# Patient Record
Sex: FEMALE | Race: WHITE | Employment: FULL TIME | URBAN - NONMETROPOLITAN AREA
[De-identification: names, ages, dates, MRNs, and addresses within clinical notes are randomized per-mention and may not be internally consistent; named-entity substitution may affect disease eponyms.]

---

## 2020-06-26 LAB
CREATININE, EXTERNAL: 0.78
LDL-C, EXTERNAL: 128

## 2020-10-21 RX ORDER — CITALOPRAM 10 MG/1
10 TABLET ORAL DAILY
COMMUNITY
End: 2020-10-21 | Stop reason: SDUPTHER

## 2020-10-22 RX ORDER — CITALOPRAM 10 MG/1
10 TABLET ORAL DAILY
Qty: 90 TAB | Refills: 1 | Status: SHIPPED | OUTPATIENT
Start: 2020-10-22 | End: 2021-06-22 | Stop reason: SDUPTHER

## 2020-11-19 VITALS — HEIGHT: 62 IN

## 2020-11-19 PROBLEM — F41.9 ANXIETY: Status: ACTIVE | Noted: 2020-11-19

## 2020-11-19 RX ORDER — LEVONORGESTREL AND ETHINYL ESTRADIOL 0.1-0.02MG
KIT ORAL
COMMUNITY

## 2020-11-19 RX ORDER — LEVOTHYROXINE SODIUM 88 UG/1
TABLET ORAL
COMMUNITY
End: 2021-06-23

## 2020-11-20 ENCOUNTER — VIRTUAL VISIT (OUTPATIENT)
Dept: FAMILY MEDICINE CLINIC | Age: 45
End: 2020-11-20
Payer: MEDICAID

## 2020-11-20 DIAGNOSIS — E03.9 ACQUIRED HYPOTHYROIDISM: ICD-10-CM

## 2020-11-20 DIAGNOSIS — E78.2 MIXED HYPERLIPIDEMIA: ICD-10-CM

## 2020-11-20 DIAGNOSIS — E55.9 VITAMIN D DEFICIENCY: ICD-10-CM

## 2020-11-20 DIAGNOSIS — F41.9 ANXIETY: Primary | ICD-10-CM

## 2020-11-20 PROCEDURE — 99443 PR PHYS/QHP TELEPHONE EVALUATION 21-30 MIN: CPT | Performed by: NURSE PRACTITIONER

## 2020-11-20 NOTE — PROGRESS NOTES
Lukasz Adrianas presents today for   Chief Complaint   Patient presents with    Follow Up Chronic Condition    Hypothyroidism    Anxiety    Cholesterol Problem       Depression Screening:  3 most recent PHQ Screens 11/20/2020   Little interest or pleasure in doing things Not at all   Feeling down, depressed, irritable, or hopeless Not at all   Total Score PHQ 2 0       Learning Assessment:  No flowsheet data found. Abuse Screening:  No flowsheet data found. Fall Risk  Fall Risk Assessment, last 12 mths 11/20/2020   Able to walk? Yes   Fall in past 12 months? No       ADL  ADL Assessment 11/20/2020   Feeding yourself No Help Needed   Getting from bed to chair No Help Needed   Getting dressed No Help Needed   Bathing or showering No Help Needed   Walk across the room (includes cane/walker) No Help Needed   Using the telphone No Help Needed   Taking your medications No Help Needed   Preparing meals No Help Needed   Managing money (expenses/bills) No Help Needed   Moderately strenuous housework (laundry) No Help Needed   Shopping for personal items (toiletries/medicines) No Help Needed   Shopping for groceries No Help Needed   Driving No Help Needed   Climbing a flight of stairs No Help Needed   Getting to places beyond walking distances No Help Needed       Health Maintenance reviewed and discussed and ordered per Provider. Health Maintenance Due   Topic Date Due    DTaP/Tdap/Td series (1 - Tdap) 06/17/1996    PAP AKA CERVICAL CYTOLOGY  06/17/1996    Flu Vaccine (1) 09/01/2020   . Coordination of Care:  1. Have you been to the ER, urgent care clinic since your last visit? Hospitalized since your last visit? Urgent Care- positive Covid x Oct. 20, 2020    2. Have you seen or consulted any other health care providers outside of the [address] [place] [address] since your last visit? Include any pap smears or colon screening.  No       Pap- 09/03/20 per patient   Mammogram - 09/03/20 per patient

## 2020-11-20 NOTE — PROGRESS NOTES
Carmela Fink is a 39 y.o. female, evaluated via audio-only technology on 11/20/2020 for Follow Up Chronic Condition; Hypothyroidism; Anxiety; and Cholesterol Problem  . Assessment & Plan:   Diagnoses and all orders for this visit:    1. Anxiety  -Controlled at this time   -Continue with medication as directed   -For any worsening please follow-up  2. Acquired hypothyroidism  -Checking status   - I will adjust medication if needed   -For now continue with medication as directed   -Follow-up in 4 months   -     CBC W/O DIFF; Future  -     METABOLIC PANEL, COMPREHENSIVE; Future  -     T4, FREE; Future  -     TSH 3RD GENERATION; Future    3. Mixed hyperlipidemia  -Checking status   -     LIPID PANEL; Future    4. Vitamin D deficiency  -Checking status   -     VITAMIN D, 25 HYDROXY            12  Subjective: Anxiety   Patient is seen for anxiety disorder. Current treatment includes Celexa and no other therapies. Ongoing symptoms include: none. Patient denies: palpitations, shortness of breath, racing thoughts, psychomotor agitation, difficulty concentrating. Reported side effects from the treatment: none. Hypothyroidism  Patient complains of hypothyroidism. Symptoms include None at this time. Patient denies weight changes, heat / cold intolerance, change in energy level, palpitations, nervousness, diarrhea. Compliant with medication, taking as directed. Prior to Admission medications    Medication Sig Start Date End Date Taking? Authorizing Provider   levonorgestrel-ethinyl estradiol (AVIANE, ALESSE, LESSINA) 0.1-20 mg-mcg tab Take  by mouth. Yes Provider, Historical   levothyroxine (Synthroid) 88 mcg tablet Take  by mouth Daily (before breakfast). Yes Provider, Historical   citalopram (CELEXA) 10 mg tablet Take 1 Tab by mouth daily. 10/22/20  Yes Shira Vuong NP         Review of Systems   Constitutional: Negative for chills, fever, malaise/fatigue and weight loss.    Eyes: Negative for blurred vision. Respiratory: Negative for cough. Cardiovascular: Negative for chest pain. Gastrointestinal: Negative for abdominal pain and heartburn. Genitourinary: Negative for dysuria. Musculoskeletal: Negative for myalgias. Skin: Negative for rash. Neurological: Negative for dizziness, weakness and headaches. Endo/Heme/Allergies: Does not bruise/bleed easily. Psychiatric/Behavioral: Negative for depression and suicidal ideas. The patient is not nervous/anxious and does not have insomnia. Physical Exam  Vitals signs and nursing note reviewed. Constitutional:       Comments: Physical exam was deferred due to COVID- 19 precautions as visit was completed via telemedicine. No flowsheet data found. Erik Alonzo, who was evaluated through a patient-initiated, synchronous (real-time) audio only encounter, and/or her healthcare decision maker, is aware that it is a billable service, with coverage as determined by her insurance carrier. She provided verbal consent to proceed: Yes. She has not had a related appointment within my department in the past 7 days or scheduled within the next 24 hours.       Total Time: minutes: 21-30 minutes    Magnolia Mayer NP

## 2020-11-24 ENCOUNTER — HOSPITAL ENCOUNTER (OUTPATIENT)
Dept: LAB | Age: 45
Discharge: HOME | End: 2020-11-24

## 2020-12-04 ENCOUNTER — TELEPHONE (OUTPATIENT)
Dept: FAMILY MEDICINE CLINIC | Age: 45
End: 2020-12-04

## 2020-12-07 RX ORDER — LEVOTHYROXINE SODIUM 88 UG/1
1 TABLET ORAL
COMMUNITY
End: 2020-12-16 | Stop reason: SDUPTHER

## 2020-12-07 RX ORDER — LEVOTHYROXINE SODIUM 88 UG/1
88 TABLET ORAL
Qty: 90 TAB | Refills: 1 | Status: CANCELLED | OUTPATIENT
Start: 2020-12-07

## 2020-12-07 NOTE — TELEPHONE ENCOUNTER
Spoke with patient and informed her labs were normal per provider however we did not receive her vitamin d results yet. I informed patient that hospital may have not drawn that lab from 11/20 and only jesus her 11/24 labs. Patient was advised to go back to hospital to get lab for vitamin d drawn. Patient verbalized understanding.

## 2020-12-17 RX ORDER — LEVOTHYROXINE SODIUM 88 UG/1
88 TABLET ORAL
Qty: 90 TAB | Refills: 1 | Status: SHIPPED | OUTPATIENT
Start: 2020-12-17 | End: 2021-04-19

## 2021-02-19 ENCOUNTER — HOSPITAL ENCOUNTER (OUTPATIENT)
Dept: LAB | Age: 46
Discharge: HOME | End: 2021-02-19

## 2021-04-19 RX ORDER — LEVOTHYROXINE SODIUM 88 UG/1
TABLET ORAL
Qty: 90 TAB | Refills: 1 | Status: SHIPPED | OUTPATIENT
Start: 2021-04-19 | End: 2021-05-03 | Stop reason: SDUPTHER

## 2021-05-03 RX ORDER — LEVOTHYROXINE SODIUM 88 UG/1
TABLET ORAL
Qty: 90 TAB | Refills: 1 | Status: SHIPPED | OUTPATIENT
Start: 2021-05-03 | End: 2021-11-05 | Stop reason: SDUPTHER

## 2021-05-12 ENCOUNTER — VIRTUAL VISIT (OUTPATIENT)
Dept: FAMILY MEDICINE CLINIC | Age: 46
End: 2021-05-12
Payer: MEDICAID

## 2021-05-12 DIAGNOSIS — F41.9 ANXIETY: Primary | ICD-10-CM

## 2021-05-12 DIAGNOSIS — N30.00 ACUTE CYSTITIS WITHOUT HEMATURIA: ICD-10-CM

## 2021-05-12 LAB
BILIRUB UR QL STRIP: NEGATIVE
GLUCOSE UR-MCNC: NEGATIVE MG/DL
KETONES P FAST UR STRIP-MCNC: NEGATIVE MG/DL
PH UR STRIP: 7 [PH] (ref 4.6–8)
PROT UR QL STRIP: NEGATIVE
SP GR UR STRIP: 1.01 (ref 1–1.03)
UA UROBILINOGEN AMB POC: NORMAL (ref 0.2–1)
URINALYSIS CLARITY POC: CLEAR
URINALYSIS COLOR POC: NORMAL
URINE BLOOD POC: NORMAL
URINE LEUKOCYTES POC: NORMAL
URINE NITRITES POC: NEGATIVE

## 2021-05-12 PROCEDURE — 99212 OFFICE O/P EST SF 10 MIN: CPT | Performed by: INTERNAL MEDICINE

## 2021-05-12 PROCEDURE — 81000 URINALYSIS NONAUTO W/SCOPE: CPT | Performed by: INTERNAL MEDICINE

## 2021-05-12 RX ORDER — NITROFURANTOIN (MACROCRYSTALS) 100 MG/1
100 CAPSULE ORAL 2 TIMES DAILY
Qty: 20 CAP | Refills: 0 | Status: SHIPPED | OUTPATIENT
Start: 2021-05-12 | End: 2021-05-22

## 2021-05-12 NOTE — PROGRESS NOTES
Saratha Schwab (: 1975) is a 39 y.o. female, established patient, here for evaluation of the following chief complaint(s):   Urinary Frequency (last several days) and Vaginal Discharge  Patient has several days of discomfort increased frequency change of color. Review of systems was negative other than for her urinary complaints. Her work-up in the past has been negative. ASSESSMENT/PLAN:  Below is the assessment and plan developed based on review of pertinent labs, studies, and medications. AMB POC URINALYSIS DIP STICK MANUAL W/ MICRO    2. Acute cystitis without hematuria  Patient has a history of UTIs for which she is seen in urological evaluation. She otherwise currently has no fever or chills. Does note some changes in her urine color as well as some thickness to the urine. Therefore we will start her on some antibiotics. Noted nitrofurantoin. Culture. - CULTURE, URINE; Future      No follow-ups on file. SUBJECTIVE/OBJECTIVE:  HPI    Review of Systems     No flowsheet data found. Physical Exam    Patient has normal mentation normal hearing normal speech. Saratha Schwab, was evaluated through a synchronous (real-time) audio-video encounter. The patient (or guardian if applicable) is aware that this is a billable service. Verbal consent to proceed has been obtained within the past 12 months. The visit was conducted pursuant to the emergency declaration under the [address] [place] [address], [address] [place] [address] authority and the [employer] and [employer] General Act. Patient identification was verified, and a caregiver was present when appropriate. The patient was located in a state where the provider was credentialed to provide care. An electronic signature was used to authenticate this note.   -- Melissa Pinedo MD

## 2021-05-12 NOTE — PROGRESS NOTES
Hollie Hernandez presents today for   Chief Complaint   Patient presents with    Urinary Frequency     last several days    Vaginal Discharge       Virtual/telephone visit    Depression Screening:  3 most recent PHQ Screens 5/12/2021   Little interest or pleasure in doing things Not at all   Feeling down, depressed, irritable, or hopeless Not at all   Total Score PHQ 2 0       Learning Assessment:  Learning Assessment 11/20/2020   PRIMARY LEARNER Patient   HIGHEST LEVEL OF EDUCATION - PRIMARY LEARNER  2 YEARS [address] [employer] LEARNER NONE   CO-LEARNER CAREGIVER No   PRIMARY LANGUAGE ENGLISH   LEARNER PREFERENCE PRIMARY DEMONSTRATION   ANSWERED BY Jovanna Haji   RELATIONSHIP SELF       Fall Risk  Fall Risk Assessment, last 12 mths 11/20/2020   Able to walk? Yes   Fall in past 12 months? No       ADL  ADL Assessment 11/20/2020   Feeding yourself No Help Needed   Getting from bed to chair No Help Needed   Getting dressed No Help Needed   Bathing or showering No Help Needed   Walk across the room (includes cane/walker) No Help Needed   Using the telphone No Help Needed   Taking your medications No Help Needed   Preparing meals No Help Needed   Managing money (expenses/bills) No Help Needed   Moderately strenuous housework (laundry) No Help Needed   Shopping for personal items (toiletries/medicines) No Help Needed   Shopping for groceries No Help Needed   Driving No Help Needed   Climbing a flight of stairs No Help Needed   Getting to places beyond walking distances No Help Needed       Health Maintenance reviewed and discussed and ordered per Provider. Health Maintenance Due   Topic Date Due    Hepatitis C Screening  Never done    COVID-19 Vaccine (1) Never done    DTaP/Tdap/Td series (1 - Tdap) Never done    PAP AKA CERVICAL CYTOLOGY  Never done   . Coordination of Care:  1. Have you been to the ER, urgent care clinic since your last visit? Hospitalized since your last visit? No    2.  Have you seen or consulted any other health care providers outside of the [address] [place] [address] since your last visit? Include any pap smears or colon screening.  No

## 2021-05-16 LAB
BACTERIA UR CULT: ABNORMAL
BACTERIA UR CULT: ABNORMAL
SPECIMEN STATUS REPORT, ROLRST: NORMAL

## 2021-06-22 RX ORDER — CITALOPRAM 10 MG/1
10 TABLET ORAL DAILY
Qty: 90 TABLET | Refills: 1 | Status: SHIPPED | OUTPATIENT
Start: 2021-06-22 | End: 2021-12-29 | Stop reason: SDUPTHER

## 2021-06-22 NOTE — TELEPHONE ENCOUNTER
Requested Prescriptions     Pending Prescriptions Disp Refills    citalopram (CELEXA) 10 mg tablet 90 Tablet 1     Sig: Take 1 Tablet by mouth daily.

## 2021-06-23 ENCOUNTER — OFFICE VISIT (OUTPATIENT)
Dept: FAMILY MEDICINE CLINIC | Age: 46
End: 2021-06-23
Payer: COMMERCIAL

## 2021-06-23 VITALS
BODY MASS INDEX: 28.9 KG/M2 | TEMPERATURE: 98.4 F | OXYGEN SATURATION: 99 % | WEIGHT: 158 LBS | HEART RATE: 78 BPM | DIASTOLIC BLOOD PRESSURE: 75 MMHG | SYSTOLIC BLOOD PRESSURE: 112 MMHG

## 2021-06-23 DIAGNOSIS — E55.9 VITAMIN D DEFICIENCY: ICD-10-CM

## 2021-06-23 DIAGNOSIS — Z76.89 ENCOUNTER TO ESTABLISH CARE WITH NEW DOCTOR: ICD-10-CM

## 2021-06-23 DIAGNOSIS — E03.9 ACQUIRED HYPOTHYROIDISM: Primary | ICD-10-CM

## 2021-06-23 DIAGNOSIS — E03.9 ACQUIRED HYPOTHYROIDISM: ICD-10-CM

## 2021-06-23 PROCEDURE — 99213 OFFICE O/P EST LOW 20 MIN: CPT | Performed by: NURSE PRACTITIONER

## 2021-06-23 NOTE — PROGRESS NOTES
Brittany Farrellneal presents today for   Chief Complaint   Patient presents with   [address] [facility]    Follow-up     6 month follow up with no complaints, patient has appt with her OB in September for her PAP       Is someone accompanying this pt? no    Is the patient using any DME equipment during [address]? no    Depression Screening:  3 most recent PHQ Screens 6/23/2021   Little interest or pleasure in doing things Not at all   Feeling down, depressed, irritable, or hopeless Not at all   Total Score PHQ 2 0       Learning Assessment:  Learning Assessment 11/20/2020   PRIMARY LEARNER Patient   HIGHEST LEVEL OF EDUCATION - PRIMARY LEARNER  [facility] NONE   CO-LEARNER CAREGIVER No   PRIMARY LANGUAGE ENGLISH   LEARNER PREFERENCE PRIMARY DEMONSTRATION   ANSWERED BY Aamir Marie   RELATIONSHIP SELF       Fall Risk  Fall Risk Assessment, last 12 mths 11/20/2020   Able to walk? Yes   Fall in past 12 months? No       ADL  ADL Assessment 6/23/2021   Feeding yourself No Help Needed   Getting from bed to chair No Help Needed   Getting dressed No Help Needed   Bathing or showering No Help Needed   Walk across the room (includes cane/walker) No Help Needed   Using the telphone No Help Needed   Taking your medications No Help Needed   Preparing meals No Help Needed   Managing money (expenses/bills) No Help Needed   Moderately strenuous housework (laundry) No Help Needed   Shopping for personal items (toiletries/medicines) No Help Needed   Shopping for groceries No Help Needed   Driving No Help Needed   Climbing a flight of stairs No Help Needed   Getting to places beyond walking distances No Help Needed       Travel Screening:    Travel Screening     Question   Response    In the last month, have you been in contact with someone who was confirmed or suspected to have Coronavirus / COVID-19? No / Unsure    Have you had a COVID-19 viral test in the last 14 days?   No    Do you have any of the following new or worsening symptoms? None of these    Have you traveled internationally or domestically in the last month? No      Travel History   Travel since 05/23/21     No documented travel since 05/23/21          Health Maintenance reviewed and discussed and ordered per Provider. Health Maintenance Due   Topic Date Due    Hepatitis C Screening  Never done    COVID-19 Vaccine (1) Never done    DTaP/Tdap/Td series (1 - Tdap) Never done    PAP AKA CERVICAL CYTOLOGY  Never done   . Coordination of Care:  1. Have you been to the ER, urgent care clinic since your last visit? Hospitalized since your last visit? no    2. Have you seen or consulted any other health care providers outside of the [address] [place] [address] since your last visit? Include any pap smears or colon screening.  no

## 2021-06-23 NOTE — PROGRESS NOTES
[facility] Medicine    Progress Note      Patient: Amanda Wallis MRN: [id number]  SSN: [id number]    YOB: 1975  Age: 55 y.o. Sex: female        Assessment:     1. Hypothyroidism  2. Anxiety. Plan:     1. TSH ordered. 2. Continue Celexa for anxiety  3. Continue oral birth control medication. 4. Follow up in 1 year for TSH monitoring and medication efficacy        Subjective:     No new complaints. Patient reports she will see ophthalmologist for right eye tear duct overproduction problem. Awake, alert, appropriate, oriented x 3  Voiding  VSS    Objective:       HEENT: wnl  HEART: RRR, no rubs or gallops  LUNGS: Clear to auscultation  ABDOMEN: soft with active BS. No tenderness, no distention, no organomegaly  EXT: warm,no edema              Labs: Results:   Chemistry No results for input(s): GLU, NA, K, CL, CO2, BUN, CREA, CA, AGAP, BUCR, TBIL, AP, TP, ALB, GLOB, AGRAT in the last 72 hours. No lab exists for component: GPT   CBC w/Diff No results for input(s): WBC, RBC, HGB, HCT, PLT, GRANS, LYMPH, EOS, HGBEXT, HCTEXT, PLTEXT in the last 72 hours. Coagulation No results for input(s): PTP, INR, APTT, INREXT in the last 72 hours. Iron/Ferritin No results for input(s): IRON in the last 72 hours. No lab exists for component: TIBCCALC   BNP No results for input(s): BNPP in the last 72 hours. Cardiac Enzymes No results for input(s): CPK, CKND1, RUDDY in the last 72 hours. No lab exists for component: CKRMB, TROIP   Liver Enzymes No results for input(s): TP, ALB, TBIL, AP in the last 72 hours.     No lab exists for component: SGOT, GPT, DBIL   Thyroid Studies No results found for: T4, T3U, TSH, TSHEXT         All Micro Results     None                    Signed By: Haresh Aceves NP     June 23, 2021

## 2021-06-25 ENCOUNTER — HOSPITAL ENCOUNTER (OUTPATIENT)
Dept: LAB | Age: 46
Discharge: HOME | End: 2021-06-25

## 2021-06-26 LAB
25(OH)D3+25(OH)D2 SERPL-MCNC: 35.4 NG/ML (ref 30–100)
ALBUMIN SERPL-MCNC: 3.8 G/DL (ref 3.8–4.8)
ALBUMIN/GLOB SERPL: 1.6 {RATIO} (ref 1.2–2.2)
ALP SERPL-CCNC: 75 IU/L (ref 48–121)
ALT SERPL-CCNC: 7 IU/L (ref 0–32)
AST SERPL-CCNC: 17 IU/L (ref 0–40)
BILIRUB SERPL-MCNC: 0.3 MG/DL (ref 0–1.2)
BUN SERPL-MCNC: 13 MG/DL (ref 6–24)
BUN/CREAT SERPL: 21 (ref 9–23)
CALCIUM SERPL-MCNC: 8.6 MG/DL (ref 8.7–10.2)
CHLORIDE SERPL-SCNC: 106 MMOL/L (ref 96–106)
CHOLEST SERPL-MCNC: 202 MG/DL (ref 100–199)
CO2 SERPL-SCNC: 24 MMOL/L (ref 20–29)
CREAT SERPL-MCNC: 0.61 MG/DL (ref 0.57–1)
ERYTHROCYTE [DISTWIDTH] IN BLOOD BY AUTOMATED COUNT: 12.1 % (ref 11.7–15.4)
EST. AVERAGE GLUCOSE BLD GHB EST-MCNC: 105 MG/DL
GLOBULIN SER CALC-MCNC: 2.4 G/DL (ref 1.5–4.5)
GLUCOSE SERPL-MCNC: 83 MG/DL (ref 65–99)
HBA1C MFR BLD: 5.3 % (ref 4.8–5.6)
HCT VFR BLD AUTO: 40.1 % (ref 34–46.6)
HDLC SERPL-MCNC: 45 MG/DL
HGB BLD-MCNC: 13.3 G/DL (ref 11.1–15.9)
LDLC SERPL CALC-MCNC: 141 MG/DL (ref 0–99)
MCH RBC QN AUTO: 31.2 PG (ref 26.6–33)
MCHC RBC AUTO-ENTMCNC: 33.2 G/DL (ref 31.5–35.7)
MCV RBC AUTO: 94 FL (ref 79–97)
PLATELET # BLD AUTO: 236 X10E3/UL (ref 150–450)
POTASSIUM SERPL-SCNC: 4.4 MMOL/L (ref 3.5–5.2)
PROT SERPL-MCNC: 6.2 G/DL (ref 6–8.5)
RBC # BLD AUTO: 4.26 X10E6/UL (ref 3.77–5.28)
SODIUM SERPL-SCNC: 140 MMOL/L (ref 134–144)
TRIGL SERPL-MCNC: 90 MG/DL (ref 0–149)
TSH SERPL DL<=0.005 MIU/L-ACNC: 1.44 UIU/ML (ref 0.45–4.5)
VIT B12 SERPL-MCNC: 415 PG/ML (ref 232–1245)
VLDLC SERPL CALC-MCNC: 16 MG/DL (ref 5–40)
WBC # BLD AUTO: 4.3 X10E3/UL (ref 3.4–10.8)

## 2021-06-28 NOTE — PROGRESS NOTES
The patient was contacted in reference to her resulted laboratory values. LDL = 160, total cholesterol = 231, and Hgb A1c=5.8. During the conversation the patient was advised of her elevated lipid results and elevated hemoglobin A1c. She was advised to come in for an appointment to discuss a plan to address these at a parameter lab results. The patient did not want to come in for an appointment until 6 months. She said she would start to eat better and her elevated labs would come down. The patient was educated her of increased risk of a cardiovascular event based on her ASCVD Risk = 13.8, optimal ASCVD Risk=6. 9.

## 2021-06-29 ENCOUNTER — TELEPHONE (OUTPATIENT)
Dept: FAMILY MEDICINE CLINIC | Age: 46
End: 2021-06-29

## 2021-08-30 ENCOUNTER — HOSPITAL ENCOUNTER (OUTPATIENT)
Dept: GENERAL RADIOLOGY | Age: 46
Discharge: HOME | End: 2021-08-30
Attending: NURSE PRACTITIONER
Payer: MEDICAID

## 2021-08-30 ENCOUNTER — HOSPITAL ENCOUNTER (OUTPATIENT)
Dept: GENERAL RADIOLOGY | Age: 46
Discharge: HOME | End: 2021-08-30
Payer: MEDICAID

## 2021-08-30 ENCOUNTER — OFFICE VISIT (OUTPATIENT)
Dept: FAMILY MEDICINE CLINIC | Age: 46
End: 2021-08-30
Payer: MEDICAID

## 2021-08-30 VITALS
HEART RATE: 87 BPM | OXYGEN SATURATION: 100 % | BODY MASS INDEX: 28.49 KG/M2 | HEIGHT: 62 IN | WEIGHT: 154.8 LBS | SYSTOLIC BLOOD PRESSURE: 139 MMHG | DIASTOLIC BLOOD PRESSURE: 83 MMHG

## 2021-08-30 DIAGNOSIS — M54.2 CERVICALGIA: ICD-10-CM

## 2021-08-30 DIAGNOSIS — M62.838 NECK MUSCLE SPASM: ICD-10-CM

## 2021-08-30 DIAGNOSIS — M54.2 CERVICALGIA: Primary | ICD-10-CM

## 2021-08-30 PROCEDURE — 99214 OFFICE O/P EST MOD 30 MIN: CPT | Performed by: NURSE PRACTITIONER

## 2021-08-30 PROCEDURE — 72040 X-RAY EXAM NECK SPINE 2-3 VW: CPT

## 2021-08-30 RX ORDER — PREDNISONE 20 MG/1
TABLET ORAL
Qty: 21 TABLET | Refills: 0 | Status: SHIPPED | OUTPATIENT
Start: 2021-08-30 | End: 2022-05-23 | Stop reason: ALTCHOICE

## 2021-08-30 RX ORDER — TIZANIDINE 4 MG/1
TABLET ORAL
Qty: 42 TABLET | Refills: 1 | Status: SHIPPED | OUTPATIENT
Start: 2021-08-30 | End: 2022-05-23 | Stop reason: ALTCHOICE

## 2021-08-30 NOTE — PROGRESS NOTES
Ena Cook presents today for   Chief Complaint   Patient presents with    Headache       Is someone accompanying this pt? no    Is the patient using any DME equipment during OV? no    Depression Screening:  3 most recent PHQ Screens 8/30/2021   Little interest or pleasure in doing things Not at all   Feeling down, depressed, irritable, or hopeless Not at all   Total Score PHQ 2 0       Learning Assessment:  Learning Assessment 11/20/2020   PRIMARY LEARNER Patient   HIGHEST LEVEL OF EDUCATION - PRIMARY LEARNER  2 YEARS [address] [employer] LEARNER NONE   CO-LEARNER CAREGIVER No   PRIMARY LANGUAGE ENGLISH   LEARNER PREFERENCE PRIMARY DEMONSTRATION   ANSWERED BY Gilberto Perez   RELATIONSHIP SELF       Fall Risk  Fall Risk Assessment, last 12 mths 11/20/2020   Able to walk? Yes   Fall in past 12 months? No       Health Maintenance reviewed and discussed and ordered per Provider. Health Maintenance Due   Topic Date Due    Hepatitis C Screening  Never done    COVID-19 Vaccine (1) Never done    DTaP/Tdap/Td series (1 - Tdap) Never done    PAP AKA CERVICAL CYTOLOGY  Never done    Colorectal Cancer Screening Combo  Never done   . Coordination of Care:  1. Have you been to the ER, urgent care clinic since your last visit? Hospitalized since your last visit? no    2. Have you seen or consulted any other health care providers outside of the [address] [place] [address] since your last visit? Include any pap smears or colon screening.  OBGYN
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
## 2021-09-01 ENCOUNTER — TELEPHONE (OUTPATIENT)
Dept: FAMILY MEDICINE CLINIC | Age: 46
End: 2021-09-01

## 2021-09-01 NOTE — TELEPHONE ENCOUNTER
Spoke with patient on 9/1/2021 of patient's x-ray results patient understood and had no further questions at this time.

## 2021-09-01 NOTE — TELEPHONE ENCOUNTER
Spoke with patient via phone call on 9/1/2021 at 9 AM of x-ray results. Patient understood and had no further questions at this time. Patient has asked to go to [facility] physical therapy in [place] will order referral at this time.

## 2021-11-05 RX ORDER — LEVOTHYROXINE SODIUM 88 UG/1
TABLET ORAL
Qty: 90 TABLET | Refills: 1 | Status: SHIPPED | OUTPATIENT
Start: 2021-11-05 | End: 2022-04-27

## 2021-11-05 NOTE — TELEPHONE ENCOUNTER
Requested Prescriptions     Pending Prescriptions Disp Refills    levothyroxine (Synthroid) 88 mcg tablet 90 Tablet 1     Sig: TAKE 1 TABLET DAILY BEFORE BREAKFAST

## 2021-12-29 ENCOUNTER — OFFICE VISIT (OUTPATIENT)
Dept: FAMILY MEDICINE CLINIC | Age: 46
End: 2021-12-29
Payer: COMMERCIAL

## 2021-12-29 VITALS
WEIGHT: 153.8 LBS | HEIGHT: 62 IN | RESPIRATION RATE: 14 BRPM | DIASTOLIC BLOOD PRESSURE: 81 MMHG | OXYGEN SATURATION: 96 % | TEMPERATURE: 98.7 F | HEART RATE: 88 BPM | SYSTOLIC BLOOD PRESSURE: 129 MMHG | BODY MASS INDEX: 28.3 KG/M2

## 2021-12-29 DIAGNOSIS — Z13.0 SCREENING FOR DEFICIENCY ANEMIA: ICD-10-CM

## 2021-12-29 DIAGNOSIS — E78.2 MIXED HYPERLIPIDEMIA: ICD-10-CM

## 2021-12-29 DIAGNOSIS — E55.9 VITAMIN D DEFICIENCY: ICD-10-CM

## 2021-12-29 DIAGNOSIS — Z13.1 DIABETES MELLITUS SCREENING: ICD-10-CM

## 2021-12-29 DIAGNOSIS — F41.9 ANXIETY: ICD-10-CM

## 2021-12-29 DIAGNOSIS — E03.9 ACQUIRED HYPOTHYROIDISM: Primary | ICD-10-CM

## 2021-12-29 DIAGNOSIS — E03.9 ACQUIRED HYPOTHYROIDISM: ICD-10-CM

## 2021-12-29 PROCEDURE — 99214 OFFICE O/P EST MOD 30 MIN: CPT | Performed by: NURSE PRACTITIONER

## 2021-12-29 RX ORDER — CITALOPRAM 10 MG/1
10 TABLET ORAL DAILY
Qty: 90 TABLET | Refills: 3 | Status: SHIPPED | OUTPATIENT
Start: 2021-12-29 | End: 2022-05-23 | Stop reason: SDUPTHER

## 2021-12-29 NOTE — PROGRESS NOTES
History of Present Illness  Alma Delia Templeton is a 55 y.o. female who presents today for:    Chief Complaint   Patient presents with    Follow-up    Medication Evaluation     Past Medical History  Past Medical History:   Diagnosis Date    Acquired hypothyroidism 6/23/2021    Allergies     Anxiety disorder     History of chicken pox     Hypothyroidism     Obstruction of left tear duct     Patient reports blockage of tear duct surgery approximately 6 years ago        Surgical History  Past Surgical History:   Procedure Laterality Date    HX OTHER SURGICAL      tear duct surgery        Current Medications  Current Outpatient Medications   Medication Sig    levothyroxine (Synthroid) 88 mcg tablet TAKE 1 TABLET DAILY BEFORE BREAKFAST    tiZANidine (ZANAFLEX) 4 mg tablet 1 tablet three times a day as needed for neck muscle spasm    predniSONE (DELTASONE) 20 mg tablet Take 3 tablets for 3 days, then 2 tablets for 3 days, then 1 tablet for 3 days, then 1/2 tablet for 3 days.  citalopram (CELEXA) 10 mg tablet Take 1 Tablet by mouth daily.  levonorgestrel-ethinyl estradiol (AVIANE, ALESSE, LESSINA) 0.1-20 mg-mcg tab Take  by mouth. No current facility-administered medications for this visit.        Allergies/Drug Reactions  Allergies   Allergen Reactions    Sulfa (Sulfonamide Antibiotics) Hives and Itching        Family History  Family History   Problem Relation Age of Onset    No Known Problems Mother     No Known Problems Father     No Known Problems Sister     No Known Problems Brother         Social History  Social History     Tobacco Use    Smoking status: Never Smoker    Smokeless tobacco: Never Used   Vaping Use    Vaping Use: Never used   Substance Use Topics    Alcohol use: Never    Drug use: Never        Health Maintenance   Topic Date Due    Hepatitis C Screening  Never done    COVID-19 Vaccine (1) Never done    DTaP/Tdap/Td series (1 - Tdap) Never done    Cervical cancer screen Never done    Colorectal Cancer Screening Combo  Never done    Flu Vaccine (1) 09/01/2021    Lipid Screen  06/25/2026    Pneumococcal 0-64 years  Aged [employer] History   Administered Date(s) Administered    Influenza Vaccine 10/16/2019     Physical Exam  Vital signs:   Vitals:    12/29/21 1005   BP: 129/81   Pulse: 88   Resp: 14   Temp: 98.7 °F (37.1 °C)   TempSrc: Oral   SpO2: 96%   Weight: 153 lb 12.8 oz (69.8 kg)   Height: 5' 2\" (1.575 m)     General: alert, oriented, not in distress  Head: scalp normal, atraumatic  Eyes: pupils are equal and reactive, full and intact EOM's  Ears: patent ear canal, intact tympanic membrane  Nose: normal turbinates, no congestion or discharge  Lips/Mouth: moist lips and buccal mucosa, non-enlarged tonsils, pink throat  Neck: supple, no JVD, no lymphadenopathy, non-palpable thyroid  Chest/Lungs: clear breath sounds, no wheezing or crackles  Heart: normal rate, regular rhythm, no murmur  Abdomen: soft, non-distended, non-tender, normal bowel sounds, no organomegaly, no masses  Extremities: no focal deformities, no edema  Skin: no active skin lesions    Laboratory/Tests:  No visits with results within 3 Month(s) from this visit.    Latest known visit with results is:   Orders Only on 06/23/2021   Component Date Value Ref Range Status    WBC 06/25/2021 4.3  3.4 - 10.8 x10E3/uL Final    RBC 06/25/2021 4.26  3.77 - 5.28 x10E6/uL Final    HGB 06/25/2021 13.3  11.1 - 15.9 g/dL Final    HCT 06/25/2021 40.1  34.0 - 46.6 % Final    MCV 06/25/2021 94  79 - 97 fL Final    MCH 06/25/2021 31.2  26.6 - 33.0 pg Final    MCHC 06/25/2021 33.2  31.5 - 35.7 g/dL Final    RDW 06/25/2021 12.1  11.7 - 15.4 % Final    PLATELET 27/72/9774 743  150 - 450 x10E3/uL Final    Cholesterol, total 06/25/2021 202* 100 - 199 mg/dL Final    Triglyceride 06/25/2021 90  0 - 149 mg/dL Final    HDL Cholesterol 06/25/2021 45  >39 mg/dL Final    VLDL, calculated 06/25/2021 16  5 - 40 mg/dL Final    LDL, calculated 06/25/2021 141* 0 - 99 mg/dL Final    Glucose 06/25/2021 83  65 - 99 mg/dL Final    BUN 06/25/2021 13  6 - 24 mg/dL Final    Creatinine 06/25/2021 0.61  0.57 - 1.00 mg/dL Final    GFR est non-AA 06/25/2021 109  >59 mL/min/1.73 Final    GFR est AA 06/25/2021 126  >59 mL/min/1.73 Final    Comment: **[facility] currently reports eGFR in compliance with the current**    recommendations of the [employer]. Tommie Castro will    update reporting as new guidelines are published from the NKF-ASN    Task force.  BUN/Creatinine ratio 06/25/2021 21  9 - 23 Final    Sodium 06/25/2021 140  134 - 144 mmol/L Final    Potassium 06/25/2021 4.4  3.5 - 5.2 mmol/L Final    Chloride 06/25/2021 106  96 - 106 mmol/L Final    CO2 06/25/2021 24  20 - 29 mmol/L Final    Calcium 06/25/2021 8.6* 8.7 - 10.2 mg/dL Final    Protein, total 06/25/2021 6.2  6.0 - 8.5 g/dL Final    Albumin 06/25/2021 3.8  3.8 - 4.8 g/dL Final    GLOBULIN, TOTAL 06/25/2021 2.4  1.5 - 4.5 g/dL Final    A-G Ratio 06/25/2021 1.6  1.2 - 2.2 Final    Bilirubin, total 06/25/2021 0.3  0.0 - 1.2 mg/dL Final    Alk. phosphatase 06/25/2021 75  48 - 121 IU/L Final    AST (SGOT) 06/25/2021 17  0 - 40 IU/L Final    ALT (SGPT) 06/25/2021 7  0 - 32 IU/L Final    TSH 06/25/2021 1.440  0.450 - 4.500 uIU/mL Final    Vitamin B12 06/25/2021 415  232 - 1,245 pg/mL Final    VITAMIN D, 25-HYDROXY 06/25/2021 35.4  30.0 - 100.0 ng/mL Final    Comment: Vitamin D deficiency has been defined by the [place] of  Medicine and an Endocrine Society practice guideline as a  level of serum 25-OH vitamin D less than 20 ng/mL (1,2). The Endocrine Society went on to further define vitamin D  insufficiency as a level between 21 and 29 ng/mL (2). 1. IOM ([place] of Medicine). 2010. Dietary reference     intakes for calcium and D. [address] [facility]: The     [employer].   2. Maureen ARMSTRONG, Neelima HAYES, Sincere MUNROE, et al. Evaluation, treatment, and prevention of vitamin D     deficiency: an Endocrine Society clinical practice     guideline. JCEM. 2011 Jul; 96(7):1911-30.  Hemoglobin A1c 06/25/2021 5.3  4.8 - 5.6 % Final    Comment:          Prediabetes: 5.7 - 6.4           Diabetes: >6.4           Glycemic control for adults with diabetes: <7.0      Estimated average glucose 06/25/2021 105  mg/dL Final     Patients reports resolution of neck pain. She reports she did not need to attend prescribed PT. Patient reports no pain or discomfort at this time. Physical examination performed:  Bilateral ear tympanic membrane visualized on otoscopic examination revealed no abnormalities. Cardiac auscultation revealed no arrhythmias or murmurs. Bilateral lung sounds clear to auscultation in all gongora. Bowel sounds normoactive in all 4 quadrants, abdomen soft and nontender. There is no observed bilateral lower extremity edema. Assessment/Plan:    1. Acquired hypothyroidism. Continue Levothyroxine 88 mcg tablet daily before breakfast for management of acquired hypothyroidism. 2.  Anxiety. Continue Citalopram 10 mg tablet daily for management of anxiety. I have discussed the diagnosis with the patient and the intended plan as seen in the above orders. The patient has received an after-visit summary and questions were answered concerning future plans. I have discussed medication side effects and warnings with the patient as well. I have reviewed the plan of care with the patient, accepted their input and they are in agreement with the treatment goals.        Lester Alonzo NP  December 29, 2021

## 2021-12-29 NOTE — PROGRESS NOTES
Depression Screening:  3 most recent PHQ Screens 12/29/2021   Little interest or pleasure in doing things Not at all   Feeling down, depressed, irritable, or hopeless Not at all   Total Score PHQ 2 0       Learning Assessment:  Learning Assessment 11/20/2020   PRIMARY LEARNER Patient   HIGHEST LEVEL OF EDUCATION - PRIMARY LEARNER  2 YEARS [place] PRIMARY LEARNER NONE   CO-LEARNER CAREGIVER No   PRIMARY LANGUAGE ENGLISH   LEARNER PREFERENCE PRIMARY DEMONSTRATION   ANSWERED BY Jason Negro   RELATIONSHIP SELF       Abuse Screening:  Abuse Screening Questionnaire 6/23/2021   Do you ever feel afraid of your partner? N   Are you in a relationship with someone who physically or mentally threatens you? N   Is it safe for you to go home? Y       Fall Risk  Fall Risk Assessment, last 12 mths 11/20/2020   Able to walk? Yes   Fall in past 12 months? No       ADL  ADL Assessment 6/23/2021   Feeding yourself No Help Needed   Getting from bed to chair No Help Needed   Getting dressed No Help Needed   Bathing or showering No Help Needed   Walk across the room (includes cane/walker) No Help Needed   Using the telphone No Help Needed   Taking your medications No Help Needed   Preparing meals No Help Needed   Managing money (expenses/bills) No Help Needed   Moderately strenuous housework (laundry) No Help Needed   Shopping for personal items (toiletries/medicines) No Help Needed   Shopping for groceries No Help Needed   Driving No Help Needed   Climbing a flight of stairs No Help Needed   Getting to places beyond walking distances No Help Needed       Travel Screening:    Travel Screening     Question   Response    In the last month, have you been in contact with someone who was confirmed or suspected to have Coronavirus / COVID-19? No / Unsure    Have you had a COVID-19 viral test in the last 14 days? No    Do you have any of the following new or worsening symptoms?   None of these    Have you traveled internationally or domestically in the last month? No      Travel History   Travel since 11/29/21    No documented travel since 11/29/21         Health Maintenance reviewed and discussed and ordered per Provider. Health Maintenance Due   Topic Date Due    Hepatitis C Screening  Never done    COVID-19 Vaccine (1) Never done    DTaP/Tdap/Td series (1 - Tdap) Never done    Cervical cancer screen  Never done    Colorectal Cancer Screening Combo  Never done    Flu Vaccine (1) 09/01/2021   . Nithin Brown presents today for   Chief Complaint   Patient presents with    Follow-up    Medication Evaluation       Is someone accompanying this pt? no    Is the patient using any DME equipment during OV? no    Coordination of Care:  1. Have you been to the ER, urgent care clinic since your last visit? Hospitalized since your last visit? no    2. Have you seen or consulted any other health care providers outside of the [address] [place] [address] since your last visit? Include any pap smears or colon screening.  no

## 2022-03-18 LAB — COLONOSCOPY, EXTERNAL: NORMAL

## 2022-03-19 PROBLEM — E03.9 ACQUIRED HYPOTHYROIDISM: Status: ACTIVE | Noted: 2021-06-23

## 2022-03-19 PROBLEM — F41.9 ANXIETY: Status: ACTIVE | Noted: 2020-11-19

## 2022-04-27 RX ORDER — LEVOTHYROXINE SODIUM 88 UG/1
TABLET ORAL
Qty: 90 TABLET | Refills: 1 | Status: SHIPPED | OUTPATIENT
Start: 2022-04-27

## 2022-05-23 ENCOUNTER — OFFICE VISIT (OUTPATIENT)
Dept: FAMILY MEDICINE CLINIC | Age: 47
End: 2022-05-23
Payer: COMMERCIAL

## 2022-05-23 VITALS
DIASTOLIC BLOOD PRESSURE: 80 MMHG | OXYGEN SATURATION: 96 % | SYSTOLIC BLOOD PRESSURE: 126 MMHG | HEART RATE: 84 BPM | HEIGHT: 62 IN | WEIGHT: 155 LBS | RESPIRATION RATE: 16 BRPM | BODY MASS INDEX: 28.52 KG/M2

## 2022-05-23 DIAGNOSIS — F41.9 ANXIETY: ICD-10-CM

## 2022-05-23 DIAGNOSIS — E55.9 VITAMIN D DEFICIENCY: ICD-10-CM

## 2022-05-23 DIAGNOSIS — E78.2 MIXED HYPERLIPIDEMIA: ICD-10-CM

## 2022-05-23 DIAGNOSIS — Z13.1 DIABETES MELLITUS SCREENING: ICD-10-CM

## 2022-05-23 DIAGNOSIS — R68.89 OTHER GENERAL SYMPTOMS AND SIGNS: ICD-10-CM

## 2022-05-23 DIAGNOSIS — E03.9 ACQUIRED HYPOTHYROIDISM: ICD-10-CM

## 2022-05-23 DIAGNOSIS — E03.9 ACQUIRED HYPOTHYROIDISM: Primary | ICD-10-CM

## 2022-05-23 PROCEDURE — 99214 OFFICE O/P EST MOD 30 MIN: CPT | Performed by: NURSE PRACTITIONER

## 2022-05-23 RX ORDER — CITALOPRAM 10 MG/1
10 TABLET ORAL DAILY
Qty: 90 TABLET | Refills: 3 | Status: SHIPPED | OUTPATIENT
Start: 2022-05-23

## 2022-05-23 NOTE — PROGRESS NOTES
Patrick Ma presents today for   Chief Complaint   Patient presents with    Pre-op Exam     pre op for surgery 6/3/2022       Is someone accompanying this pt? No     Is the patient using any DME equipment during OV? No     Depression Screening:  3 most recent PHQ Screens 5/23/2022   Little interest or pleasure in doing things Not at all   Feeling down, depressed, irritable, or hopeless Not at all   Total Score PHQ 2 0       Learning Assessment:  Learning Assessment 11/20/2020   PRIMARY LEARNER Patient   HIGHEST LEVEL OF EDUCATION - PRIMARY LEARNER  2 YEARS [place] PRIMARY LEARNER NONE   CO-LEARNER CAREGIVER No   PRIMARY LANGUAGE ENGLISH   LEARNER PREFERENCE PRIMARY DEMONSTRATION   ANSWERED BY Carmenza Galindo   RELATIONSHIP SELF       Fall Risk  Fall Risk Assessment, last 12 mths 11/20/2020   Able to walk? Yes   Fall in past 12 months? No       ADL  ADL Assessment 6/23/2021   Feeding yourself No Help Needed   Getting from bed to chair No Help Needed   Getting dressed No Help Needed   Bathing or showering No Help Needed   Walk across the room (includes cane/walker) No Help Needed   Using the telphone No Help Needed   Taking your medications No Help Needed   Preparing meals No Help Needed   Managing money (expenses/bills) No Help Needed   Moderately strenuous housework (laundry) No Help Needed   Shopping for personal items (toiletries/medicines) No Help Needed   Shopping for groceries No Help Needed   Driving No Help Needed   Climbing a flight of stairs No Help Needed   Getting to places beyond walking distances No Help Needed       Travel Screening:    Travel Screening     Question   Response    In the last 10 days, have you been in contact with someone who was confirmed or suspected to have Coronavirus/COVID-19? No / Unsure    Have you had a COVID-19 viral test in the last 10 days? No    Do you have any of the following new or worsening symptoms?   None of these    Have you traveled internationally or domestically in the last month? No      Travel History   Travel since 04/23/22    No documented travel since 04/23/22         Health Maintenance reviewed and discussed and ordered per Provider. Health Maintenance Due   Topic Date Due    Hepatitis C Screening  Never done    COVID-19 Vaccine (1) Never done    DTaP/Tdap/Td series (1 - Tdap) Never done    Cervical cancer screen  Never done    Colorectal Cancer Screening Combo  Never done   . Coordination of Care:  1. \"Have you been to the ER, urgent care clinic since your last visit? Hospitalized since your last visit? \" No    2. \"Have you seen or consulted any other health care providers outside of the [address] [place] [address] since your last visit? \" No     3. For patients aged 39-70: Has the patient had a colonoscopy? Yes - no Care Gap present     If the patient is female:    4. For patients aged 41-77: Has the patient had a mammogram within the past 2 years? Yes - no Care Gap present    5. For patients aged 21-65: Has the patient had a pap smear?  Yes - no Care Gap present

## 2022-05-23 NOTE — PROGRESS NOTES
History of Present Illness  Kamryn Phelan is a 55 y.o. female who presents today for:    Chief Complaint   Patient presents with    Pre-op Exam     pre op for surgery 6/3/2022     Past Medical History  Past Medical History:   Diagnosis Date    Acquired hypothyroidism 6/23/2021    Allergies     Anxiety disorder     History of chicken pox     Hypothyroidism     Obstruction of left tear duct     Patient reports blockage of tear duct surgery approximately 6 years ago        Surgical History  Past Surgical History:   Procedure Laterality Date    HX OTHER SURGICAL      tear duct surgery        Current Medications  Current Outpatient Medications   Medication Sig    levothyroxine (Synthroid) 88 mcg tablet TAKE 1 TABLET DAILY BEFORE BREAKFAST    citalopram (CELEXA) 10 mg tablet Take 1 Tablet by mouth daily.  levonorgestrel-ethinyl estradiol (AVIANE, ALESSE, LESSINA) 0.1-20 mg-mcg tab Take  by mouth.  tiZANidine (ZANAFLEX) 4 mg tablet 1 tablet three times a day as needed for neck muscle spasm (Patient not taking: Reported on 5/23/2022)    predniSONE (DELTASONE) 20 mg tablet Take 3 tablets for 3 days, then 2 tablets for 3 days, then 1 tablet for 3 days, then 1/2 tablet for 3 days. (Patient not taking: Reported on 5/23/2022)     No current facility-administered medications for this visit.        Allergies/Drug Reactions  Allergies   Allergen Reactions    Sulfa (Sulfonamide Antibiotics) Hives and Itching        Family History  Family History   Problem Relation Age of Onset    No Known Problems Mother     No Known Problems Father     No Known Problems Sister     No Known Problems Brother         Social History  Social History     Tobacco Use    Smoking status: Never Smoker    Smokeless tobacco: Never Used   Vaping Use    Vaping Use: Never used   Substance Use Topics    Alcohol use: Never    Drug use: Never        Health Maintenance   Topic Date Due    Hepatitis C Screening  Never done    COVID-19 Vaccine (1) Never done    DTaP/Tdap/Td series (1 - Tdap) Never done    Cervical cancer screen  Never done    Colorectal Cancer Screening Combo  Never done    Flu Vaccine (Season Ended) 09/01/2022    Depression Screen  12/29/2022    Lipid Screen  06/25/2026    Pneumococcal 0-64 years  Aged [employer] History   Administered Date(s) Administered    Influenza Vaccine 10/16/2019     Physical Exam  Vital signs:   Vitals:    05/23/22 1332   BP: 126/80   Pulse: 84   Resp: 16   SpO2: 96%   Weight: 155 lb (70.3 kg)   Height: 5' 2\" (1.575 m)     Laboratory/Tests:  No visits with results within 3 Month(s) from this visit. Latest known visit with results is:   Orders Only on 06/23/2021   Component Date Value Ref Range Status    WBC 06/25/2021 4.3  3.4 - 10.8 x10E3/uL Final    RBC 06/25/2021 4.26  3.77 - 5.28 x10E6/uL Final    HGB 06/25/2021 13.3  11.1 - 15.9 g/dL Final    HCT 06/25/2021 40.1  34.0 - 46.6 % Final    MCV 06/25/2021 94  79 - 97 fL Final    MCH 06/25/2021 31.2  26.6 - 33.0 pg Final    MCHC 06/25/2021 33.2  31.5 - 35.7 g/dL Final    RDW 06/25/2021 12.1  11.7 - 15.4 % Final    PLATELET 98/09/5359 028  150 - 450 x10E3/uL Final    Cholesterol, total 06/25/2021 202* 100 - 199 mg/dL Final    Triglyceride 06/25/2021 90  0 - 149 mg/dL Final    HDL Cholesterol 06/25/2021 45  >39 mg/dL Final    VLDL, calculated 06/25/2021 16  5 - 40 mg/dL Final    LDL, calculated 06/25/2021 141* 0 - 99 mg/dL Final    Glucose 06/25/2021 83  65 - 99 mg/dL Final    BUN 06/25/2021 13  6 - 24 mg/dL Final    Creatinine 06/25/2021 0.61  0.57 - 1.00 mg/dL Final    GFR est non-AA 06/25/2021 109  >59 mL/min/1.73 Final    GFR est AA 06/25/2021 126  >59 mL/min/1.73 Final    Comment: **[facility] currently reports eGFR in compliance with the current**    recommendations of the [employer]. Zarina Alcala will    update reporting as new guidelines are published from the NKF-ASN    Task force.       BUN/Creatinine ratio 06/25/2021 21  9 - 23 Final    Sodium 06/25/2021 140  134 - 144 mmol/L Final    Potassium 06/25/2021 4.4  3.5 - 5.2 mmol/L Final    Chloride 06/25/2021 106  96 - 106 mmol/L Final    CO2 06/25/2021 24  20 - 29 mmol/L Final    Calcium 06/25/2021 8.6* 8.7 - 10.2 mg/dL Final    Protein, total 06/25/2021 6.2  6.0 - 8.5 g/dL Final    Albumin 06/25/2021 3.8  3.8 - 4.8 g/dL Final    GLOBULIN, TOTAL 06/25/2021 2.4  1.5 - 4.5 g/dL Final    A-G Ratio 06/25/2021 1.6  1.2 - 2.2 Final    Bilirubin, total 06/25/2021 0.3  0.0 - 1.2 mg/dL Final    Alk. phosphatase 06/25/2021 75  48 - 121 IU/L Final    AST (SGOT) 06/25/2021 17  0 - 40 IU/L Final    ALT (SGPT) 06/25/2021 7  0 - 32 IU/L Final    TSH 06/25/2021 1.440  0.450 - 4.500 uIU/mL Final    Vitamin B12 06/25/2021 415  232 - 1,245 pg/mL Final    VITAMIN D, 25-HYDROXY 06/25/2021 35.4  30.0 - 100.0 ng/mL Final    Comment: Vitamin D deficiency has been defined by the [place] of  Medicine and an Endocrine Society practice guideline as a  level of serum 25-OH vitamin D less than 20 ng/mL (1,2). The Endocrine Society went on to further define vitamin D  insufficiency as a level between 21 and 29 ng/mL (2). 1. IOM ([place] of Medicine). 2010. Dietary reference     intakes for calcium and D. [facility]: The     [place]. 2. Maureen MF, Neelima HAYES, Sincere MUNROE, et al.     Evaluation, treatment, and prevention of vitamin D     deficiency: an Endocrine Society clinical practice     guideline. JCEM. 2011 Jul; 96(7):1911-30.  Hemoglobin A1c 06/25/2021 5.3  4.8 - 5.6 % Final    Comment:          Prediabetes: 5.7 - 6.4           Diabetes: >6.4           Glycemic control for adults with diabetes: <7.0      Estimated average glucose 06/25/2021 105  mg/dL Final     Patient reports she will have right tear duct dilation surgery on 6/3/2022 with [facility].   Patient reports she was given paperwork which informed her of need of EKG and blood work, but she left the paperwork at home. Patient reports a surgical procedure was originally scheduled for November 2021 but was rescheduled by patient. She will follow-up with surgeon for EKG for this provider to review prior to surgical procedure. Patient reports colonoscopy in March 2022. Assessment/Plan:    1. Preoperative clearance. Awaiting lab results and EKG review for right ear duct duct dilation surgery on 6/3/2022. I have discussed the diagnosis with the patient and the intended plan as seen in the above orders. The patient has received an after-visit summary and questions were answered concerning future plans. I have discussed medication side effects and warnings with the patient as well. I have reviewed the plan of care with the patient, accepted their input and they are in agreement with the treatment goals.        Kia Espinoza NP  May 23, 2022

## 2022-05-27 ENCOUNTER — TELEPHONE (OUTPATIENT)
Dept: FAMILY MEDICINE CLINIC | Age: 47
End: 2022-05-27

## 2022-05-27 NOTE — TELEPHONE ENCOUNTER
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 Reviewed labs and EKG which showed normal sinus rhythm. She is cleared for surgery based on these readings.

## 2022-05-31 ENCOUNTER — HOSPITAL ENCOUNTER (OUTPATIENT)
Dept: LAB | Age: 47
Discharge: HOME | End: 2022-05-31

## 2022-05-31 PROCEDURE — 99001 SPECIMEN HANDLING PT-LAB: CPT

## 2022-06-01 LAB
25(OH)D3+25(OH)D2 SERPL-MCNC: 27.6 NG/ML (ref 30–100)
ALBUMIN SERPL-MCNC: 3.8 G/DL (ref 3.8–4.8)
ALBUMIN/GLOB SERPL: 1.5 {RATIO} (ref 1.2–2.2)
ALP SERPL-CCNC: 77 IU/L (ref 44–121)
ALT SERPL-CCNC: 7 IU/L (ref 0–32)
AST SERPL-CCNC: 13 IU/L (ref 0–40)
BASOPHILS # BLD AUTO: 0.1 X10E3/UL (ref 0–0.2)
BASOPHILS NFR BLD AUTO: 1 %
BILIRUB SERPL-MCNC: 0.4 MG/DL (ref 0–1.2)
BUN SERPL-MCNC: 13 MG/DL (ref 6–24)
BUN/CREAT SERPL: 18 (ref 9–23)
CALCIUM SERPL-MCNC: 8.6 MG/DL (ref 8.7–10.2)
CHLORIDE SERPL-SCNC: 104 MMOL/L (ref 96–106)
CHOLEST SERPL-MCNC: 191 MG/DL (ref 100–199)
CO2 SERPL-SCNC: 22 MMOL/L (ref 20–29)
CREAT SERPL-MCNC: 0.72 MG/DL (ref 0.57–1)
EGFR: 104 ML/MIN/1.73
EOSINOPHIL # BLD AUTO: 0.3 X10E3/UL (ref 0–0.4)
EOSINOPHIL NFR BLD AUTO: 5 %
ERYTHROCYTE [DISTWIDTH] IN BLOOD BY AUTOMATED COUNT: 12.1 % (ref 11.7–15.4)
EST. AVERAGE GLUCOSE BLD GHB EST-MCNC: 103 MG/DL
FOLATE SERPL-MCNC: 15 NG/ML
GLOBULIN SER CALC-MCNC: 2.5 G/DL (ref 1.5–4.5)
GLUCOSE SERPL-MCNC: 85 MG/DL (ref 65–99)
HBA1C MFR BLD: 5.2 % (ref 4.8–5.6)
HCT VFR BLD AUTO: 38.1 % (ref 34–46.6)
HDLC SERPL-MCNC: 59 MG/DL
HGB BLD-MCNC: 13.1 G/DL (ref 11.1–15.9)
IMM GRANULOCYTES # BLD AUTO: 0 X10E3/UL (ref 0–0.1)
IMM GRANULOCYTES NFR BLD AUTO: 0 %
LDLC SERPL CALC-MCNC: 119 MG/DL (ref 0–99)
LYMPHOCYTES # BLD AUTO: 2.6 X10E3/UL (ref 0.7–3.1)
LYMPHOCYTES NFR BLD AUTO: 46 %
MCH RBC QN AUTO: 31.9 PG (ref 26.6–33)
MCHC RBC AUTO-ENTMCNC: 34.4 G/DL (ref 31.5–35.7)
MCV RBC AUTO: 93 FL (ref 79–97)
MONOCYTES # BLD AUTO: 0.4 X10E3/UL (ref 0.1–0.9)
MONOCYTES NFR BLD AUTO: 7 %
NEUTROPHILS # BLD AUTO: 2.3 X10E3/UL (ref 1.4–7)
NEUTROPHILS NFR BLD AUTO: 41 %
PLATELET # BLD AUTO: 294 X10E3/UL (ref 150–450)
POTASSIUM SERPL-SCNC: 4.1 MMOL/L (ref 3.5–5.2)
PROT SERPL-MCNC: 6.3 G/DL (ref 6–8.5)
RBC # BLD AUTO: 4.11 X10E6/UL (ref 3.77–5.28)
SODIUM SERPL-SCNC: 139 MMOL/L (ref 134–144)
TRIGL SERPL-MCNC: 69 MG/DL (ref 0–149)
TSH SERPL DL<=0.005 MIU/L-ACNC: 1.27 UIU/ML (ref 0.45–4.5)
VIT B12 SERPL-MCNC: 350 PG/ML (ref 232–1245)
VLDLC SERPL CALC-MCNC: 13 MG/DL (ref 5–40)
WBC # BLD AUTO: 5.8 X10E3/UL (ref 3.4–10.8)

## 2022-06-29 ENCOUNTER — OFFICE VISIT (OUTPATIENT)
Dept: FAMILY MEDICINE CLINIC | Age: 47
End: 2022-06-29
Payer: COMMERCIAL

## 2022-06-29 VITALS
TEMPERATURE: 97.4 F | BODY MASS INDEX: 28.16 KG/M2 | HEIGHT: 62 IN | OXYGEN SATURATION: 97 % | SYSTOLIC BLOOD PRESSURE: 111 MMHG | HEART RATE: 70 BPM | WEIGHT: 153 LBS | RESPIRATION RATE: 20 BRPM | DIASTOLIC BLOOD PRESSURE: 72 MMHG

## 2022-06-29 DIAGNOSIS — E55.9 VITAMIN D DEFICIENCY: ICD-10-CM

## 2022-06-29 DIAGNOSIS — E03.9 ACQUIRED HYPOTHYROIDISM: Primary | ICD-10-CM

## 2022-06-29 DIAGNOSIS — F41.9 ANXIETY: ICD-10-CM

## 2022-06-29 PROCEDURE — 99214 OFFICE O/P EST MOD 30 MIN: CPT | Performed by: NURSE PRACTITIONER

## 2022-06-29 RX ORDER — ERGOCALCIFEROL 1.25 MG/1
50000 CAPSULE ORAL
Qty: 26 CAPSULE | Refills: 1 | Status: SHIPPED | OUTPATIENT
Start: 2022-06-29

## 2022-06-29 NOTE — PROGRESS NOTES
Gina Escalante presents today for   Chief Complaint   Patient presents with    Follow-up     6m follow up        Is someone accompanying this pt? NO    Is the patient using any DME equipment during OV? NO    Depression Screening:  3 most recent PHQ Screens 6/29/2022   Little interest or pleasure in doing things Not at all   Feeling down, depressed, irritable, or hopeless Not at all   Total Score PHQ 2 0       Learning Assessment:  Learning Assessment 11/20/2020   PRIMARY LEARNER Patient   HIGHEST LEVEL OF EDUCATION - PRIMARY LEARNER  2 YEARS [place] PRIMARY LEARNER NONE   CO-LEARNER CAREGIVER No   PRIMARY LANGUAGE ENGLISH   LEARNER PREFERENCE PRIMARY DEMONSTRATION   ANSWERED BY Jon Owusu   RELATIONSHIP SELF       Fall Risk  Fall Risk Assessment, last 12 mths 11/20/2020   Able to walk? Yes   Fall in past 12 months? No       Health Maintenance reviewed and discussed and ordered per Provider. Health Maintenance Due   Topic Date Due    Hepatitis C Screening  Never done    COVID-19 Vaccine (1) Never done    DTaP/Tdap/Td series (1 - Tdap) Never done    Colorectal Cancer Screening Combo  Never done   . Coordination of Care:    1. \"Have you been to the ER, urgent care clinic since your last visit? Hospitalized since your last visit? \" No    2. \"Have you seen or consulted any other health care providers outside of the [address] [place] [address] since your last visit? \" No     3. For patients aged 39-70: Has the patient had a colonoscopy? No     If the patient is female:    4. For patients aged 41-77: Has the patient had a mammogram within the past 2 years? No    5. For patients aged 21-65: Has the patient had a pap smear?  Yes - no Care Gap present

## 2022-06-29 NOTE — PROGRESS NOTES
History of Present Illness  Mayra Baig is a 52 y.o. female who presents today for:    Chief Complaint   Patient presents with    Follow-up     6m follow up      Past Medical History  Past Medical History:   Diagnosis Date    Acquired hypothyroidism 6/23/2021    Allergies     Anxiety disorder     History of chicken pox     Hypothyroidism     Obstruction of left tear duct     Patient reports blockage of tear duct surgery approximately 6 years ago        Surgical History  Past Surgical History:   Procedure Laterality Date    HX OTHER SURGICAL      tear duct surgery        Current Medications  Current Outpatient Medications   Medication Sig    citalopram (CELEXA) 10 mg tablet Take 1 Tablet by mouth daily.  levothyroxine (Synthroid) 88 mcg tablet TAKE 1 TABLET DAILY BEFORE BREAKFAST    levonorgestrel-ethinyl estradiol (AVIANE, ALESSE, LESSINA) 0.1-20 mg-mcg tab Take  by mouth. No current facility-administered medications for this visit.        Allergies/Drug Reactions  Allergies   Allergen Reactions    Sulfa (Sulfonamide Antibiotics) Hives and Itching        Family History  Family History   Problem Relation Age of Onset    No Known Problems Mother     No Known Problems Father     No Known Problems Sister     No Known Problems Brother         Social History  Social History     Tobacco Use    Smoking status: Never Smoker    Smokeless tobacco: Never Used   Vaping Use    Vaping Use: Never used   Substance Use Topics    Alcohol use: Never    Drug use: Never        Health Maintenance   Topic Date Due    Hepatitis C Screening  Never done    COVID-19 Vaccine (1) Never done    DTaP/Tdap/Td series (1 - Tdap) Never done    Colorectal Cancer Screening Combo  Never done    Flu Vaccine (Season Ended) 09/01/2022    Depression Screen  05/23/2023    Cervical cancer screen  09/09/2026    Lipid Screen  05/31/2027    Pneumococcal 0-64 years  Aged [employer] History   Administered Date(s) Administered    Influenza Vaccine 10/16/2019     Physical Exam  Vital signs:   Vitals:    06/29/22 0910   Weight: 153 lb (69.4 kg)   Height: 5' 2\" (1.575 m)       Laboratory/Tests:  Orders Only on 05/23/2022   Component Date Value Ref Range Status    WBC 05/31/2022 5.8  3.4 - 10.8 x10E3/uL Final    RBC 05/31/2022 4.11  3.77 - 5.28 x10E6/uL Final    HGB 05/31/2022 13.1  11.1 - 15.9 g/dL Final    HCT 05/31/2022 38.1  34.0 - 46.6 % Final    MCV 05/31/2022 93  79 - 97 fL Final    MCH 05/31/2022 31.9  26.6 - 33.0 pg Final    MCHC 05/31/2022 34.4  31.5 - 35.7 g/dL Final    RDW 05/31/2022 12.1  11.7 - 15.4 % Final    PLATELET 28/39/0771 796  150 - 450 x10E3/uL Final    NEUTROPHILS 05/31/2022 41  Not Estab. % Final    Lymphocytes 05/31/2022 46  Not Estab. % Final    MONOCYTES 05/31/2022 7  Not Estab. % Final    EOSINOPHILS 05/31/2022 5  Not Estab. % Final    BASOPHILS 05/31/2022 1  Not Estab. % Final    ABS. NEUTROPHILS 05/31/2022 2.3  1.4 - 7.0 x10E3/uL Final    Abs Lymphocytes 05/31/2022 2.6  0.7 - 3.1 x10E3/uL Final    ABS. MONOCYTES 05/31/2022 0.4  0.1 - 0.9 x10E3/uL Final    ABS. EOSINOPHILS 05/31/2022 0.3  0.0 - 0.4 x10E3/uL Final    ABS. BASOPHILS 05/31/2022 0.1  0.0 - 0.2 x10E3/uL Final    IMMATURE GRANULOCYTES 05/31/2022 0  Not Estab. % Final    ABS. IMM.  GRANS. 05/31/2022 0.0  0.0 - 0.1 x10E3/uL Final    Hemoglobin A1c 05/31/2022 5.2  4.8 - 5.6 % Final    Comment:          Prediabetes: 5.7 - 6.4           Diabetes: >6.4           Glycemic control for adults with diabetes: <7.0      Estimated average glucose 05/31/2022 103  mg/dL Final    Cholesterol, total 05/31/2022 191  100 - 199 mg/dL Final    Triglyceride 05/31/2022 69  0 - 149 mg/dL Final    HDL Cholesterol 05/31/2022 59  >39 mg/dL Final    VLDL, calculated 05/31/2022 13  5 - 40 mg/dL Final    LDL, calculated 05/31/2022 119* 0 - 99 mg/dL Final    Glucose 05/31/2022 85  65 - 99 mg/dL Final    BUN 05/31/2022 13  6 - 24 mg/dL Final  Creatinine 05/31/2022 0.72  0.57 - 1.00 mg/dL Final    eGFR 05/31/2022 104  >59 mL/min/1.73 Final    BUN/Creatinine ratio 05/31/2022 18  9 - 23 Final    Sodium 05/31/2022 139  134 - 144 mmol/L Final    Potassium 05/31/2022 4.1  3.5 - 5.2 mmol/L Final    Chloride 05/31/2022 104  96 - 106 mmol/L Final    CO2 05/31/2022 22  20 - 29 mmol/L Final    Calcium 05/31/2022 8.6* 8.7 - 10.2 mg/dL Final    Protein, total 05/31/2022 6.3  6.0 - 8.5 g/dL Final    Albumin 05/31/2022 3.8  3.8 - 4.8 g/dL Final    GLOBULIN, TOTAL 05/31/2022 2.5  1.5 - 4.5 g/dL Final    A-G Ratio 05/31/2022 1.5  1.2 - 2.2 Final    Bilirubin, total 05/31/2022 0.4  0.0 - 1.2 mg/dL Final    Alk. phosphatase 05/31/2022 77  44 - 121 IU/L Final    AST (SGOT) 05/31/2022 13  0 - 40 IU/L Final    ALT (SGPT) 05/31/2022 7  0 - 32 IU/L Final    TSH 05/31/2022 1.270  0.450 - 4.500 uIU/mL Final    Vitamin B12 05/31/2022 350  232 - 1,245 pg/mL Final    Folate 05/31/2022 15.0  >3.0 ng/mL Final    Comment: A serum folate concentration of less than 3.1 ng/mL is  considered to represent clinical deficiency.  VITAMIN D, 25-HYDROXY 05/31/2022 27.6* 30.0 - 100.0 ng/mL Final    Comment: Vitamin D deficiency has been defined by the [address] practice guideline as a  level of serum 25-OH vitamin D less than 20 ng/mL (1,2). The Endocrine Society went on to further define vitamin D  insufficiency as a level between 21 and 29 ng/mL (2). 1. IOM ([place] of Medicine). 2010. Dietary reference     intakes for calcium and D. [facility]: The     [employer]. 2. Maureen MF, Neelima NC, Sincere MUNROE, et al.     Evaluation, treatment, and prevention of vitamin D     deficiency: an Endocrine Society clinical practice     guideline. JCEM. 2011 Jul; 96(7):1911-30. Vitamin D = 27.6 on 5/31/2022 and prescribed Ergocalciferol at this visit.     Patient reports her right tear duct dilation surgery on 6/3/2022 with [facility] was performed successfully. She will follow up with [facility] on July 27, 2022 to have stent removed. Assessment/Plan:    1. Acquired hypothyroidism. Continue Levothyroxine 88 mcg tablet daily before breakfast for management of acquired hypothyroidism. 2.  Anxiety. Continue Citalopram 10 mg tablet daily for management of anxiety. 3.  Vitamin D deficiency. Prescribed Ergocalciferol 50,000 units q. 7 days for management of vitamin D deficiency. I have discussed the diagnosis with the patient and the intended plan as seen in the above orders. The patient has received an after-visit summary and questions were answered concerning future plans. I have discussed medication side effects and warnings with the patient as well. I have reviewed the plan of care with the patient, accepted their input and they are in agreement with the treatment goals.        Angelia Sacks, NP  June 29, 2022

## 2022-11-17 RX ORDER — LEVOTHYROXINE SODIUM 88 UG/1
TABLET ORAL
Qty: 90 TABLET | Refills: 1 | Status: SHIPPED | OUTPATIENT
Start: 2022-11-17

## 2023-02-03 DIAGNOSIS — Z13.6 SCREENING FOR CARDIOVASCULAR CONDITION: Primary | ICD-10-CM

## 2023-06-02 ENCOUNTER — TELEPHONE (OUTPATIENT)
Facility: CLINIC | Age: 48
End: 2023-06-02

## 2023-06-02 RX ORDER — LEVOTHYROXINE SODIUM 88 UG/1
88 TABLET ORAL DAILY
Qty: 90 TABLET | Refills: 0 | Status: SHIPPED | OUTPATIENT
Start: 2023-06-02

## 2023-06-02 RX ORDER — CITALOPRAM HYDROBROMIDE 10 MG/1
10 TABLET ORAL DAILY
Qty: 90 TABLET | Refills: 0 | Status: SHIPPED | OUTPATIENT
Start: 2023-06-02 | End: 2023-06-28 | Stop reason: SDUPTHER

## 2023-06-02 NOTE — TELEPHONE ENCOUNTER
Patient called and stated that she was bit by a tick 4 weeks ago and states that where she was bit at is still itching and rash like. Patient would like to know what should she do? Her callback number is [phone]. Please advise.

## 2023-06-28 ENCOUNTER — OFFICE VISIT (OUTPATIENT)
Facility: CLINIC | Age: 48
End: 2023-06-28
Payer: COMMERCIAL

## 2023-06-28 VITALS
WEIGHT: 157.8 LBS | RESPIRATION RATE: 18 BRPM | SYSTOLIC BLOOD PRESSURE: 131 MMHG | DIASTOLIC BLOOD PRESSURE: 75 MMHG | HEART RATE: 88 BPM | OXYGEN SATURATION: 97 % | TEMPERATURE: 98 F | BODY MASS INDEX: 29.04 KG/M2 | HEIGHT: 62 IN

## 2023-06-28 DIAGNOSIS — Z13.1 DIABETES MELLITUS SCREENING: ICD-10-CM

## 2023-06-28 DIAGNOSIS — Z11.59 NEED FOR HEPATITIS C SCREENING TEST: ICD-10-CM

## 2023-06-28 DIAGNOSIS — F41.9 ANXIETY: ICD-10-CM

## 2023-06-28 DIAGNOSIS — Z72.89 OTHER PROBLEMS RELATED TO LIFESTYLE: ICD-10-CM

## 2023-06-28 DIAGNOSIS — Z11.4 SCREENING FOR HIV WITHOUT PRESENCE OF RISK FACTORS: ICD-10-CM

## 2023-06-28 DIAGNOSIS — R68.89 OTHER GENERAL SYMPTOMS AND SIGNS: ICD-10-CM

## 2023-06-28 DIAGNOSIS — E03.9 ACQUIRED HYPOTHYROIDISM: ICD-10-CM

## 2023-06-28 DIAGNOSIS — E78.2 MIXED HYPERLIPIDEMIA: ICD-10-CM

## 2023-06-28 DIAGNOSIS — E55.9 VITAMIN D DEFICIENCY, UNSPECIFIED: ICD-10-CM

## 2023-06-28 DIAGNOSIS — E03.9 ACQUIRED HYPOTHYROIDISM: Primary | ICD-10-CM

## 2023-06-28 PROCEDURE — 99396 PREV VISIT EST AGE 40-64: CPT | Performed by: NURSE PRACTITIONER

## 2023-06-28 RX ORDER — CITALOPRAM 10 MG/1
10 TABLET ORAL DAILY
Qty: 90 TABLET | Refills: 3 | Status: SHIPPED | OUTPATIENT
Start: 2023-06-28

## 2023-06-28 RX ORDER — ERGOCALCIFEROL 1.25 MG/1
50000 CAPSULE ORAL
Qty: 13 CAPSULE | Refills: 3 | Status: SHIPPED | OUTPATIENT
Start: 2023-06-28

## 2023-06-28 SDOH — ECONOMIC STABILITY: INCOME INSECURITY: HOW HARD IS IT FOR YOU TO PAY FOR THE VERY BASICS LIKE FOOD, HOUSING, MEDICAL CARE, AND HEATING?: NOT HARD AT ALL

## 2023-06-28 SDOH — ECONOMIC STABILITY: FOOD INSECURITY: WITHIN THE PAST 12 MONTHS, THE FOOD YOU BOUGHT JUST DIDN'T LAST AND YOU DIDN'T HAVE MONEY TO GET MORE.: NEVER TRUE

## 2023-06-28 SDOH — ECONOMIC STABILITY: HOUSING INSECURITY
IN THE LAST 12 MONTHS, WAS THERE A TIME WHEN YOU DID NOT HAVE A STEADY PLACE TO SLEEP OR SLEPT IN A SHELTER (INCLUDING NOW)?: NO

## 2023-06-28 SDOH — ECONOMIC STABILITY: FOOD INSECURITY: WITHIN THE PAST 12 MONTHS, YOU WORRIED THAT YOUR FOOD WOULD RUN OUT BEFORE YOU GOT MONEY TO BUY MORE.: NEVER TRUE

## 2023-06-28 ASSESSMENT — PATIENT HEALTH QUESTIONNAIRE - PHQ9
SUM OF ALL RESPONSES TO PHQ QUESTIONS 1-9: 0
SUM OF ALL RESPONSES TO PHQ QUESTIONS 1-9: 0
SUM OF ALL RESPONSES TO PHQ9 QUESTIONS 1 & 2: 0
SUM OF ALL RESPONSES TO PHQ QUESTIONS 1-9: 0
1. LITTLE INTEREST OR PLEASURE IN DOING THINGS: 0
SUM OF ALL RESPONSES TO PHQ QUESTIONS 1-9: 0
2. FEELING DOWN, DEPRESSED OR HOPELESS: 0

## 2023-06-29 ENCOUNTER — TELEPHONE (OUTPATIENT)
Facility: CLINIC | Age: 48
End: 2023-06-29

## 2023-06-29 NOTE — TELEPHONE ENCOUNTER
Patient called requesting NP García Keenan to send her lab work to [facility].     Patient number: [phone]

## 2023-07-06 ENCOUNTER — HOSPITAL ENCOUNTER (OUTPATIENT)
Age: 48
Discharge: HOME | End: 2023-07-06
Payer: COMMERCIAL

## 2023-07-06 LAB
ALBUMIN SERPL-MCNC: 3.3 G/DL (ref 3.4–5)
ALBUMIN/GLOB SERPL: 0.9 (ref 0.8–1.7)
ALP SERPL-CCNC: 75 U/L (ref 45–117)
ALT SERPL-CCNC: 13 U/L (ref 13–56)
ANION GAP SERPL CALC-SCNC: 8 MMOL/L (ref 3–18)
AST SERPL W P-5'-P-CCNC: 11 U/L (ref 10–38)
BASOPHILS # BLD: 0.1 K/UL (ref 0–0.1)
BASOPHILS NFR BLD: 1 % (ref 0–2)
BILIRUB SERPL-MCNC: 0.4 MG/DL (ref 0.2–1)
BUN SERPL-MCNC: 12 MG/DL (ref 7–18)
BUN/CREAT SERPL: 16 (ref 12–20)
CA-I BLD-MCNC: 8.4 MG/DL (ref 8.5–10.1)
CHLORIDE SERPL-SCNC: 110 MMOL/L (ref 100–111)
CO2 SERPL-SCNC: 25 MMOL/L (ref 21–32)
CREAT SERPL-MCNC: 0.77 MG/DL (ref 0.6–1.3)
DIFFERENTIAL METHOD BLD: NORMAL
EOSINOPHIL # BLD: 0.2 K/UL (ref 0–0.4)
EOSINOPHIL NFR BLD: 2 % (ref 0–5)
ERYTHROCYTE [DISTWIDTH] IN BLOOD BY AUTOMATED COUNT: 12.2 % (ref 11.6–14.5)
EST. AVERAGE GLUCOSE BLD GHB EST-MCNC: 97 MG/DL
GLOBULIN SER CALC-MCNC: 3.5 G/DL (ref 2–4)
GLUCOSE SERPL-MCNC: 90 MG/DL (ref 74–99)
HBA1C MFR BLD: 5 % (ref 4.2–5.6)
HCT VFR BLD AUTO: 39.2 % (ref 35–45)
HCV AB SER IA-ACNC: 0.05 INDEX
HCV AB SERPL QL IA: NEGATIVE
HGB BLD-MCNC: 13.4 G/DL (ref 12–16)
HIV 1+2 AB+HIV1 P24 AG SERPL QL IA: NONREACTIVE
HIV 1/2 RESULT COMMENT: NORMAL
IMM GRANULOCYTES # BLD AUTO: 0 K/UL (ref 0–0.04)
IMM GRANULOCYTES NFR BLD AUTO: 0 % (ref 0–0.5)
LYMPHOCYTES # BLD: 2.6 K/UL (ref 0.9–3.6)
LYMPHOCYTES NFR BLD: 39 % (ref 21–52)
Lab: NORMAL
MCH RBC QN AUTO: 31.5 PG (ref 24–34)
MCHC RBC AUTO-ENTMCNC: 34.2 G/DL (ref 31–37)
MCV RBC AUTO: 92 FL (ref 78–100)
MONOCYTES # BLD: 0.4 K/UL (ref 0.05–1.2)
MONOCYTES NFR BLD: 6 % (ref 3–10)
NEUTS SEG # BLD: 3.5 K/UL (ref 1.8–8)
NEUTS SEG NFR BLD: 52 % (ref 40–73)
NRBC # BLD: 0 K/UL (ref 0–0.01)
NRBC BLD-RTO: 0 PER 100 WBC
PLATELET # BLD AUTO: 259 K/UL (ref 135–420)
PMV BLD AUTO: 10.9 FL (ref 9.2–11.8)
POTASSIUM SERPL-SCNC: 3.9 MMOL/L (ref 3.5–5.5)
PROT SERPL-MCNC: 6.8 G/DL (ref 6.4–8.2)
RBC # BLD AUTO: 4.26 M/UL (ref 4.2–5.3)
SODIUM SERPL-SCNC: 143 MMOL/L (ref 136–145)
TSH SERPL DL<=0.05 MIU/L-ACNC: 2.47 UIU/ML (ref 0.36–3.74)
VIT B12 SERPL-MCNC: 380 PG/ML (ref 211–911)
WBC # BLD AUTO: 6.8 K/UL (ref 4.6–13.2)

## 2023-07-06 PROCEDURE — 86803 HEPATITIS C AB TEST: CPT

## 2023-07-06 PROCEDURE — 80061 LIPID PANEL: CPT

## 2023-07-06 PROCEDURE — 84443 ASSAY THYROID STIM HORMONE: CPT

## 2023-07-06 PROCEDURE — 80053 COMPREHEN METABOLIC PANEL: CPT

## 2023-07-06 PROCEDURE — 87389 HIV-1 AG W/HIV-1&-2 AB AG IA: CPT

## 2023-07-06 PROCEDURE — 83036 HEMOGLOBIN GLYCOSYLATED A1C: CPT

## 2023-07-06 PROCEDURE — 36415 COLL VENOUS BLD VENIPUNCTURE: CPT

## 2023-07-06 PROCEDURE — 82607 VITAMIN B-12: CPT

## 2023-07-06 PROCEDURE — 85025 COMPLETE CBC W/AUTO DIFF WBC: CPT

## 2023-07-07 LAB
CHOLEST SERPL-MCNC: 192 MG/DL
HDLC SERPL-MCNC: 53 MG/DL (ref 40–60)
HDLC SERPL: 3.6 (ref 0–5)
LDLC SERPL CALC-MCNC: 106.8 MG/DL (ref 0–100)
LIPID PANEL: ABNORMAL
TRIGL SERPL-MCNC: 161 MG/DL
VLDLC SERPL CALC-MCNC: 32.2 MG/DL

## 2023-07-20 DIAGNOSIS — E55.9 VITAMIN D DEFICIENCY, UNSPECIFIED: ICD-10-CM

## 2023-07-20 RX ORDER — ERGOCALCIFEROL 1.25 MG/1
50000 CAPSULE ORAL
Qty: 13 CAPSULE | Refills: 3 | OUTPATIENT
Start: 2023-07-20

## 2023-07-31 DIAGNOSIS — E55.9 VITAMIN D DEFICIENCY, UNSPECIFIED: ICD-10-CM

## 2023-07-31 DIAGNOSIS — F41.9 ANXIETY: ICD-10-CM

## 2023-07-31 RX ORDER — CITALOPRAM HYDROBROMIDE 10 MG/1
10 TABLET ORAL DAILY
Qty: 90 TABLET | Refills: 3 | Status: SHIPPED | OUTPATIENT
Start: 2023-07-31

## 2023-07-31 RX ORDER — ERGOCALCIFEROL 1.25 MG/1
50000 CAPSULE ORAL
Qty: 13 CAPSULE | Refills: 3 | Status: SHIPPED | OUTPATIENT
Start: 2023-07-31

## 2023-07-31 NOTE — TELEPHONE ENCOUNTER
[facility] called to refill Celexa and Vitamin D. They spoke with the patient and were asked to call us to transfer prescriptions to them instead of the local pharmacy.

## 2023-09-22 RX ORDER — LEVOTHYROXINE SODIUM 88 UG/1
88 TABLET ORAL DAILY
Qty: 90 TABLET | Refills: 1 | Status: SHIPPED | OUTPATIENT
Start: 2023-09-22

## 2024-02-16 ENCOUNTER — TELEPHONE (OUTPATIENT)
Facility: CLINIC | Age: 49
End: 2024-02-16

## 2024-02-16 ENCOUNTER — PATIENT MESSAGE (OUTPATIENT)
Facility: CLINIC | Age: 49
End: 2024-02-16

## 2024-02-16 DIAGNOSIS — F41.1 GENERALIZED ANXIETY DISORDER: ICD-10-CM

## 2024-02-16 DIAGNOSIS — E03.9 ACQUIRED HYPOTHYROIDISM: Primary | ICD-10-CM

## 2024-02-16 NOTE — TELEPHONE ENCOUNTER
Barbara Torrez LPN 2/16/2024 3:56 PM EST      ----- Message -----  From: Chery Hernandez  Sent: 2/16/2024 3:48 PM EST  To: *  Subject: Hair loss and thyroid     Good afternoon, I called earlier regarding some hair loss I have been noticing recently. I went for a hair appointment today and even my hairdresser noticed it was shedding more than usual. I know I take thyroid medication and thyroid can cause hair loss but also I was wondering if possibility due to hormones too? I stopped taking birth control pill the middle of Oct 2023 as I had blood work done from my GYN doctor who confirmed I was in menopause range per blood work. Could this have any relation to my hair shedding also? Good thing is new growth is coming back in it just seems more shedding than normal.

## 2024-02-16 NOTE — TELEPHONE ENCOUNTER
Patient called stating she takes medication for her thyroid and she has had some hair loss but she has been losing more hair than she normally does. Patient wants to know if she could possibly have some labs be put in and if there is any way she can get her protein levels checked?     Patients call back number is [phone]

## 2024-03-20 RX ORDER — LEVOTHYROXINE SODIUM 88 UG/1
88 TABLET ORAL DAILY
Qty: 90 TABLET | Refills: 1 | Status: SHIPPED | OUTPATIENT
Start: 2024-03-20

## 2024-03-21 ENCOUNTER — HOSPITAL ENCOUNTER (OUTPATIENT)
Age: 49
Discharge: HOME | End: 2024-03-21
Payer: COMMERCIAL

## 2024-03-21 ENCOUNTER — TELEPHONE (OUTPATIENT)
Facility: CLINIC | Age: 49
End: 2024-03-21

## 2024-03-21 DIAGNOSIS — E03.9 ACQUIRED HYPOTHYROIDISM: ICD-10-CM

## 2024-03-21 DIAGNOSIS — F41.1 GENERALIZED ANXIETY DISORDER: ICD-10-CM

## 2024-03-21 LAB
ALBUMIN SERPL-MCNC: 3.5 G/DL (ref 3.4–5)
ALBUMIN/GLOB SERPL: 1 (ref 0.8–1.7)
ALP SERPL-CCNC: 109 U/L (ref 45–117)
ALT SERPL-CCNC: 31 U/L (ref 13–56)
ANION GAP SERPL CALC-SCNC: 4 MMOL/L (ref 3–18)
AST SERPL W P-5'-P-CCNC: 31 U/L (ref 10–38)
BASOPHILS # BLD: 0.1 K/UL (ref 0–0.1)
BASOPHILS NFR BLD: 1 % (ref 0–2)
BILIRUB SERPL-MCNC: 0.4 MG/DL (ref 0.2–1)
BUN SERPL-MCNC: 12 MG/DL (ref 7–18)
BUN/CREAT SERPL: 15 (ref 12–20)
CA-I BLD-MCNC: 9.1 MG/DL (ref 8.5–10.1)
CHLORIDE SERPL-SCNC: 108 MMOL/L (ref 100–111)
CO2 SERPL-SCNC: 30 MMOL/L (ref 21–32)
CREAT SERPL-MCNC: 0.78 MG/DL (ref 0.6–1.3)
DIFFERENTIAL METHOD BLD: NORMAL
EOSINOPHIL # BLD: 0.2 K/UL (ref 0–0.4)
EOSINOPHIL NFR BLD: 3 % (ref 0–5)
ERYTHROCYTE [DISTWIDTH] IN BLOOD BY AUTOMATED COUNT: 12.3 % (ref 11.6–14.5)
GLOBULIN SER CALC-MCNC: 3.6 G/DL (ref 2–4)
GLUCOSE SERPL-MCNC: 91 MG/DL (ref 74–99)
HCT VFR BLD AUTO: 39.4 % (ref 35–45)
HGB BLD-MCNC: 13.2 G/DL (ref 12–16)
IMM GRANULOCYTES # BLD AUTO: 0 K/UL (ref 0–0.04)
IMM GRANULOCYTES NFR BLD AUTO: 0 % (ref 0–0.5)
LYMPHOCYTES # BLD: 2.6 K/UL (ref 0.9–3.6)
LYMPHOCYTES NFR BLD: 42 % (ref 21–52)
MCH RBC QN AUTO: 31 PG (ref 24–34)
MCHC RBC AUTO-ENTMCNC: 33.5 G/DL (ref 31–37)
MCV RBC AUTO: 92.5 FL (ref 78–100)
MONOCYTES # BLD: 0.5 K/UL (ref 0.05–1.2)
MONOCYTES NFR BLD: 7 % (ref 3–10)
NEUTS SEG # BLD: 2.9 K/UL (ref 1.8–8)
NEUTS SEG NFR BLD: 47 % (ref 40–73)
NRBC # BLD: 0 K/UL (ref 0–0.01)
NRBC BLD-RTO: 0 PER 100 WBC
PLATELET # BLD AUTO: 275 K/UL (ref 135–420)
PMV BLD AUTO: 10.8 FL (ref 9.2–11.8)
POTASSIUM SERPL-SCNC: 3.8 MMOL/L (ref 3.5–5.5)
PROT SERPL-MCNC: 7.1 G/DL (ref 6.4–8.2)
RBC # BLD AUTO: 4.26 M/UL (ref 4.2–5.3)
SODIUM SERPL-SCNC: 142 MMOL/L (ref 136–145)
TSH SERPL DL<=0.05 MIU/L-ACNC: 4.77 UIU/ML (ref 0.36–3.74)
WBC # BLD AUTO: 6.3 K/UL (ref 4.6–13.2)

## 2024-03-21 PROCEDURE — 80053 COMPREHEN METABOLIC PANEL: CPT

## 2024-03-21 PROCEDURE — 85025 COMPLETE CBC W/AUTO DIFF WBC: CPT

## 2024-03-21 PROCEDURE — 84443 ASSAY THYROID STIM HORMONE: CPT

## 2024-03-21 PROCEDURE — 36415 COLL VENOUS BLD VENIPUNCTURE: CPT

## 2024-03-21 NOTE — TELEPHONE ENCOUNTER
Patient called concerned about her TSH lab results, they are higher than normal and she wants to know if her Levothyroxine prescription needs to be adjusted. Please Advise [phone]

## 2024-03-22 DIAGNOSIS — F41.9 ANXIETY: ICD-10-CM

## 2024-03-22 RX ORDER — CITALOPRAM HYDROBROMIDE 10 MG/1
10 TABLET ORAL DAILY
Qty: 90 TABLET | Refills: 3 | Status: SHIPPED | OUTPATIENT
Start: 2024-03-22

## 2024-03-22 NOTE — TELEPHONE ENCOUNTER
Spoke with patient via phone call on 3/22/2024 of TSH results.  Patient understood and had no further questions at this time.  Refilled patient's Citalopram 10 mg tablet daily on 3/22/2024.

## 2024-03-28 ENCOUNTER — PATIENT MESSAGE (OUTPATIENT)
Facility: CLINIC | Age: 49
End: 2024-03-28

## 2024-03-28 NOTE — TELEPHONE ENCOUNTER
Michael Carpio 3/28/2024 10:46 AM EDT      ----- Message -----  From: Chery Hernandez  Sent: 3/28/2024 9:34 AM EDT  To: *  Subject: Collagen     Good morning, Wanted to check with you to make sure it would be ok if I started to take Collegan? with me taking thyroid meds didnt want to not take something I shouldnt be. The only thing i'm taking at this point is my thyroid meds, citalopram and a Multi vit with iron.   Thanks for your advice.

## 2024-05-20 ENCOUNTER — PATIENT MESSAGE (OUTPATIENT)
Facility: CLINIC | Age: 49
End: 2024-05-20

## 2024-05-22 NOTE — TELEPHONE ENCOUNTER
Michael Carpio 5/21/2024 11:42 AM EDT      ----- Message -----  From: Chery Hernandez  Sent: 5/21/2024 8:54 AM EDT  To: *  Subject: labs     Thank you so much.   Should I have my labs done prior to my appointment in July?

## 2024-07-03 ENCOUNTER — HOSPITAL ENCOUNTER (OUTPATIENT)
Age: 49
Discharge: HOME | End: 2024-07-06
Payer: COMMERCIAL

## 2024-07-03 ENCOUNTER — OFFICE VISIT (OUTPATIENT)
Facility: CLINIC | Age: 49
End: 2024-07-03
Payer: COMMERCIAL

## 2024-07-03 VITALS
HEART RATE: 78 BPM | OXYGEN SATURATION: 99 % | BODY MASS INDEX: 28.89 KG/M2 | TEMPERATURE: 97.5 F | DIASTOLIC BLOOD PRESSURE: 77 MMHG | SYSTOLIC BLOOD PRESSURE: 120 MMHG | WEIGHT: 157 LBS | HEIGHT: 62 IN | RESPIRATION RATE: 18 BRPM

## 2024-07-03 DIAGNOSIS — E78.2 MIXED HYPERLIPIDEMIA: ICD-10-CM

## 2024-07-03 DIAGNOSIS — E03.9 ACQUIRED HYPOTHYROIDISM: Primary | ICD-10-CM

## 2024-07-03 DIAGNOSIS — F41.1 GENERALIZED ANXIETY DISORDER: ICD-10-CM

## 2024-07-03 DIAGNOSIS — R79.9 ABNORMAL FINDING OF BLOOD CHEMISTRY, UNSPECIFIED: ICD-10-CM

## 2024-07-03 DIAGNOSIS — E55.9 VITAMIN D DEFICIENCY, UNSPECIFIED: ICD-10-CM

## 2024-07-03 DIAGNOSIS — R68.89 OTHER GENERAL SYMPTOMS AND SIGNS: ICD-10-CM

## 2024-07-03 DIAGNOSIS — E03.9 ACQUIRED HYPOTHYROIDISM: ICD-10-CM

## 2024-07-03 LAB
ALBUMIN SERPL-MCNC: 3.7 G/DL (ref 3.4–5)
ALBUMIN/GLOB SERPL: 1.1 (ref 0.8–1.7)
ALP SERPL-CCNC: 105 U/L (ref 45–117)
ALT SERPL-CCNC: 25 U/L (ref 13–56)
ANION GAP SERPL CALC-SCNC: 5 MMOL/L (ref 3–18)
AST SERPL W P-5'-P-CCNC: 27 U/L (ref 10–38)
BASOPHILS # BLD: 0.1 K/UL (ref 0–0.1)
BASOPHILS NFR BLD: 1 % (ref 0–2)
BILIRUB SERPL-MCNC: 0.6 MG/DL (ref 0.2–1)
BUN SERPL-MCNC: 15 MG/DL (ref 7–18)
BUN/CREAT SERPL: 21 (ref 12–20)
CA-I BLD-MCNC: 9.3 MG/DL (ref 8.5–10.1)
CHLORIDE SERPL-SCNC: 106 MMOL/L (ref 100–111)
CHOLEST SERPL-MCNC: 219 MG/DL
CO2 SERPL-SCNC: 30 MMOL/L (ref 21–32)
CREAT SERPL-MCNC: 0.73 MG/DL (ref 0.6–1.3)
DIFFERENTIAL METHOD BLD: NORMAL
EOSINOPHIL # BLD: 0.2 K/UL (ref 0–0.4)
EOSINOPHIL NFR BLD: 3 % (ref 0–5)
ERYTHROCYTE [DISTWIDTH] IN BLOOD BY AUTOMATED COUNT: 12.4 % (ref 11.6–14.5)
EST. AVERAGE GLUCOSE BLD GHB EST-MCNC: 97 MG/DL
GLOBULIN SER CALC-MCNC: 3.5 G/DL (ref 2–4)
GLUCOSE SERPL-MCNC: 90 MG/DL (ref 74–99)
HBA1C MFR BLD: 5 % (ref 4.2–5.6)
HCT VFR BLD AUTO: 38.9 % (ref 35–45)
HDLC SERPL-MCNC: 69 MG/DL (ref 40–60)
HDLC SERPL: 3.2 (ref 0–5)
HGB BLD-MCNC: 13.1 G/DL (ref 12–16)
IMM GRANULOCYTES # BLD AUTO: 0 K/UL (ref 0–0.04)
IMM GRANULOCYTES NFR BLD AUTO: 0 % (ref 0–0.5)
LDLC SERPL CALC-MCNC: 134 MG/DL (ref 0–100)
LIPID PANEL: ABNORMAL
LYMPHOCYTES # BLD: 2.3 K/UL (ref 0.9–3.6)
LYMPHOCYTES NFR BLD: 42 % (ref 21–52)
MCH RBC QN AUTO: 31.2 PG (ref 24–34)
MCHC RBC AUTO-ENTMCNC: 33.7 G/DL (ref 31–37)
MCV RBC AUTO: 92.6 FL (ref 78–100)
MONOCYTES # BLD: 0.4 K/UL (ref 0.05–1.2)
MONOCYTES NFR BLD: 8 % (ref 3–10)
NEUTS SEG # BLD: 2.4 K/UL (ref 1.8–8)
NEUTS SEG NFR BLD: 46 % (ref 40–73)
NRBC # BLD: 0 K/UL (ref 0–0.01)
NRBC BLD-RTO: 0 PER 100 WBC
PLATELET # BLD AUTO: 264 K/UL (ref 135–420)
PMV BLD AUTO: 10.6 FL (ref 9.2–11.8)
POTASSIUM SERPL-SCNC: 3.9 MMOL/L (ref 3.5–5.5)
PROT SERPL-MCNC: 7.2 G/DL (ref 6.4–8.2)
RBC # BLD AUTO: 4.2 M/UL (ref 4.2–5.3)
SODIUM SERPL-SCNC: 141 MMOL/L (ref 136–145)
TRIGL SERPL-MCNC: 80 MG/DL
TSH SERPL DL<=0.05 MIU/L-ACNC: 1.55 UIU/ML (ref 0.36–3.74)
VIT B12 SERPL-MCNC: 640 PG/ML (ref 211–911)
VLDLC SERPL CALC-MCNC: 16 MG/DL
WBC # BLD AUTO: 5.3 K/UL (ref 4.6–13.2)

## 2024-07-03 PROCEDURE — 80061 LIPID PANEL: CPT

## 2024-07-03 PROCEDURE — 83036 HEMOGLOBIN GLYCOSYLATED A1C: CPT

## 2024-07-03 PROCEDURE — 99214 OFFICE O/P EST MOD 30 MIN: CPT | Performed by: NURSE PRACTITIONER

## 2024-07-03 PROCEDURE — 36415 COLL VENOUS BLD VENIPUNCTURE: CPT

## 2024-07-03 PROCEDURE — 84443 ASSAY THYROID STIM HORMONE: CPT

## 2024-07-03 PROCEDURE — 85025 COMPLETE CBC W/AUTO DIFF WBC: CPT

## 2024-07-03 PROCEDURE — 80053 COMPREHEN METABOLIC PANEL: CPT

## 2024-07-03 PROCEDURE — 82607 VITAMIN B-12: CPT

## 2024-07-03 RX ORDER — ERGOCALCIFEROL 1.25 MG/1
50000 CAPSULE ORAL
Qty: 13 CAPSULE | Refills: 3 | Status: SHIPPED | OUTPATIENT
Start: 2024-07-03

## 2024-07-03 SDOH — ECONOMIC STABILITY: INCOME INSECURITY: HOW HARD IS IT FOR YOU TO PAY FOR THE VERY BASICS LIKE FOOD, HOUSING, MEDICAL CARE, AND HEATING?: NOT HARD AT ALL

## 2024-07-03 SDOH — ECONOMIC STABILITY: FOOD INSECURITY: WITHIN THE PAST 12 MONTHS, THE FOOD YOU BOUGHT JUST DIDN'T LAST AND YOU DIDN'T HAVE MONEY TO GET MORE.: NEVER TRUE

## 2024-07-03 SDOH — ECONOMIC STABILITY: FOOD INSECURITY: WITHIN THE PAST 12 MONTHS, YOU WORRIED THAT YOUR FOOD WOULD RUN OUT BEFORE YOU GOT MONEY TO BUY MORE.: NEVER TRUE

## 2024-07-03 ASSESSMENT — PATIENT HEALTH QUESTIONNAIRE - PHQ9
2. FEELING DOWN, DEPRESSED OR HOPELESS: NOT AT ALL
SUM OF ALL RESPONSES TO PHQ9 QUESTIONS 1 & 2: 0
1. LITTLE INTEREST OR PLEASURE IN DOING THINGS: NOT AT ALL
SUM OF ALL RESPONSES TO PHQ QUESTIONS 1-9: 0

## 2024-07-03 NOTE — PROGRESS NOTES
Chief Complaint   Patient presents with    Annual Exam     1 year follow up        \"Have you been to the ER, urgent care clinic since your last visit?  Hospitalized since your last visit?\"    NO    “Have you seen or consulted any other [facility] care providers [facility]?”    [facility]

## 2024-07-03 NOTE — PROGRESS NOTES
History of Present Illness  Chery Hernandez is a 49 y.o. female who presents today for:    Chief Complaint   Patient presents with    Annual Exam     1 year follow up        Past Medical History  Past Medical History:   Diagnosis Date    Acquired hypothyroidism 6/23/2021    Allergies     Anxiety disorder     History of chicken pox     Hypothyroidism     Obstruction of left tear duct     Patient reports blockage of tear duct surgery approximately 6 years ago        Surgical History  Past Surgical History:   Procedure Laterality Date    CYSTOSCOPY,RESEC EJACULATORY DUCT Right 06/01/2022    right tear duct blocked     EYE SURGERY  June 2022    tear duct    OTHER SURGICAL HISTORY      tear duct surgery        Current Medications  Current Outpatient Medications   Medication Sig    citalopram (CELEXA) 10 MG tablet Take 1 tablet by mouth daily    levothyroxine (SYNTHROID) 88 MCG tablet TAKE 1 TABLET BY MOUTH DAILY    ciprofloxacin (CIPRO) 500 MG tablet     Multiple Vitamin (MULTIVITAMIN PO) Take by mouth    ergocalciferol (ERGOCALCIFEROL) 1.25 MG (60896 UT) capsule Take 1 capsule by mouth every 7 days    levonorgestrel-ethinyl estradiol (AVIANE;ALESSE;LESSINA) 0.1-20 MG-MCG per tablet Take by mouth     No current facility-administered medications for this visit.       Allergies/Drug Reactions  Allergies   Allergen Reactions    Sulfa Antibiotics Hives, Itching, Other (See Comments) and Rash     Rash & Hives          Family History  Family History   Problem Relation Age of Onset    High Blood Pressure Sister     High Blood Pressure Brother     No Known Problems Father     High Blood Pressure Mother         Social History  Social History     Tobacco Use    Smoking status: Never    Smokeless tobacco: Never   Substance Use Topics    Alcohol use: Never    Drug use: Never        Health Maintenance   Topic Date Due    Hepatitis B vaccine (1 of 3 - 3-dose series) Never done    COVID-19 Vaccine (1) Never done    DTaP/Tdap/Td vaccine

## 2024-08-09 ENCOUNTER — OFFICE VISIT (OUTPATIENT)
Facility: CLINIC | Age: 49
End: 2024-08-09
Payer: COMMERCIAL

## 2024-08-09 ENCOUNTER — HOSPITAL ENCOUNTER (OUTPATIENT)
Age: 49
Setting detail: SPECIMEN
End: 2024-08-09
Payer: COMMERCIAL

## 2024-08-09 VITALS
SYSTOLIC BLOOD PRESSURE: 134 MMHG | RESPIRATION RATE: 18 BRPM | HEART RATE: 80 BPM | TEMPERATURE: 97.8 F | DIASTOLIC BLOOD PRESSURE: 84 MMHG | HEIGHT: 61 IN | BODY MASS INDEX: 29.83 KG/M2 | WEIGHT: 158 LBS | OXYGEN SATURATION: 100 %

## 2024-08-09 DIAGNOSIS — R35.0 URINARY FREQUENCY: Primary | ICD-10-CM

## 2024-08-09 DIAGNOSIS — R35.0 URINARY FREQUENCY: ICD-10-CM

## 2024-08-09 DIAGNOSIS — E03.9 ACQUIRED HYPOTHYROIDISM: ICD-10-CM

## 2024-08-09 DIAGNOSIS — R82.998 LEUKOCYTES IN URINE: ICD-10-CM

## 2024-08-09 DIAGNOSIS — F41.9 ANXIETY: ICD-10-CM

## 2024-08-09 LAB
APPEARANCE UR: CLEAR
BACTERIA URNS QL MICRO: ABNORMAL /HPF
BILIRUB UR QL: NEGATIVE
BILIRUBIN, URINE, POC: NEGATIVE
BLOOD URINE, POC: NORMAL
COLOR UR: YELLOW
EPITH CASTS URNS QL MICRO: ABNORMAL /LPF (ref 0–20)
GLUCOSE UR STRIP.AUTO-MCNC: NEGATIVE MG/DL
GLUCOSE URINE, POC: NEGATIVE
HGB UR QL STRIP: NEGATIVE
KETONES UR QL STRIP.AUTO: NEGATIVE MG/DL
KETONES, URINE, POC: NEGATIVE
LEUKOCYTE ESTERASE UR QL STRIP.AUTO: ABNORMAL
LEUKOCYTE ESTERASE, URINE, POC: NORMAL
NITRITE UR QL STRIP.AUTO: NEGATIVE
NITRITE, URINE, POC: NEGATIVE
PH UR STRIP: 6.5 (ref 5–8)
PH, URINE, POC: 6 (ref 4.6–8)
PROT UR STRIP-MCNC: NEGATIVE MG/DL
PROTEIN,URINE, POC: NEGATIVE
RBC #/AREA URNS HPF: ABNORMAL /HPF (ref 0–2)
SP GR UR REFRACTOMETRY: 1 (ref 1–1.03)
SPECIFIC GRAVITY, URINE, POC: 1 (ref 1–1.03)
URINALYSIS CLARITY, POC: NORMAL
URINALYSIS COLOR, POC: YELLOW
UROBILINOGEN UR QL STRIP.AUTO: 0.2 EU/DL (ref 0.2–1)
UROBILINOGEN, POC: NORMAL
WBC URNS QL MICRO: ABNORMAL /HPF (ref 0–4)

## 2024-08-09 PROCEDURE — 99214 OFFICE O/P EST MOD 30 MIN: CPT | Performed by: NURSE PRACTITIONER

## 2024-08-09 PROCEDURE — 87086 URINE CULTURE/COLONY COUNT: CPT

## 2024-08-09 PROCEDURE — 81001 URINALYSIS AUTO W/SCOPE: CPT | Performed by: NURSE PRACTITIONER

## 2024-08-09 PROCEDURE — 81001 URINALYSIS AUTO W/SCOPE: CPT

## 2024-08-09 RX ORDER — CIPROFLOXACIN 500 MG/1
500 TABLET, FILM COATED ORAL 2 TIMES DAILY
Qty: 14 TABLET | Refills: 0 | Status: SHIPPED | OUTPATIENT
Start: 2024-08-09 | End: 2024-08-16

## 2024-08-09 NOTE — PROGRESS NOTES
History of Present Illness  Chery Hernandez is a 49 y.o. female who presents today for:    Chief Complaint   Patient presents with    Pelvic Pain    Urinary Frequency       Past Medical History  Past Medical History:   Diagnosis Date    Acquired hypothyroidism 6/23/2021    Allergies     Anxiety disorder     History of chicken pox     Hypothyroidism     Obstruction of left tear duct     Patient reports blockage of tear duct surgery approximately 6 years ago        Surgical History  Past Surgical History:   Procedure Laterality Date    CYSTOSCOPY,RESEC EJACULATORY DUCT Right 06/01/2022    right tear duct blocked     EYE SURGERY  June 2022    tear duct    OTHER SURGICAL HISTORY      tear duct surgery        Current Medications  Current Outpatient Medications   Medication Sig    ergocalciferol (ERGOCALCIFEROL) 1.25 MG (63122 UT) capsule Take 1 capsule by mouth every 7 days    citalopram (CELEXA) 10 MG tablet Take 1 tablet by mouth daily    levothyroxine (SYNTHROID) 88 MCG tablet TAKE 1 TABLET BY MOUTH DAILY    ciprofloxacin (CIPRO) 500 MG tablet     Multiple Vitamin (MULTIVITAMIN PO) Take by mouth     No current facility-administered medications for this visit.       Allergies/Drug Reactions  Allergies   Allergen Reactions    Sulfa Antibiotics Hives, Itching, Other (See Comments) and Rash     Rash & Hives          Family History  Family History   Problem Relation Age of Onset    High Blood Pressure Sister     High Blood Pressure Brother     No Known Problems Father     High Blood Pressure Mother         Social History  Social History     Tobacco Use    Smoking status: Never    Smokeless tobacco: Never   Substance Use Topics    Alcohol use: Never    Drug use: Never        Health Maintenance   Topic Date Due    Hepatitis B vaccine (1 of 3 - 3-dose series) Never done    COVID-19 Vaccine (1) Never done    DTaP/Tdap/Td vaccine (1 - Tdap) Never done    Flu vaccine (1) 08/01/2024    Depression Screen  07/03/2025    Breast

## 2024-08-09 NOTE — PROGRESS NOTES
Chief Complaint   Patient presents with    Pelvic Pain    Urinary Frequency       \"Have you been to the ER, urgent care clinic since your last visit?  Hospitalized since your last visit?\"    NO    “Have you seen or consulted any other health care providers outside of [facility] since your last visit?”    [facility]

## 2024-08-10 LAB
BACTERIA SPEC CULT: NORMAL
COLONY COUNT, CNT: NORMAL
Lab: NORMAL

## 2024-09-16 RX ORDER — LEVOTHYROXINE SODIUM 88 UG/1
88 TABLET ORAL DAILY
Qty: 90 TABLET | Refills: 1 | Status: SHIPPED | OUTPATIENT
Start: 2024-09-16

## 2024-10-01 DIAGNOSIS — F41.9 ANXIETY: ICD-10-CM

## 2024-10-01 RX ORDER — LEVOTHYROXINE SODIUM 88 UG/1
88 TABLET ORAL DAILY
Qty: 90 TABLET | Refills: 1 | Status: SHIPPED | OUTPATIENT
Start: 2024-10-01

## 2024-10-01 RX ORDER — CITALOPRAM HYDROBROMIDE 10 MG/1
10 TABLET ORAL DAILY
Qty: 90 TABLET | Refills: 1 | Status: SHIPPED | OUTPATIENT
Start: 2024-10-01

## 2024-10-25 ENCOUNTER — TELEPHONE (OUTPATIENT)
Facility: CLINIC | Age: 49
End: 2024-10-25

## 2024-10-25 NOTE — TELEPHONE ENCOUNTER
----- Message from IKE BENTON LPN sent at 10/25/2024  2:15 PM EDT -----  Regarding: FW: [facility] Appointment Request    ----- Message -----  From: Titi Li  Sent: 10/25/2024   1:42 PM EDT  To: #  Subject: [facility] Appointment Request                          [facility] Appointment Request    Patient needs appointment for [facility] Appointment Type: Pre-Op Visit.    Patient Requested Dates(s): Month of November  Patient Requested Time: morning  Provider Name: Watson Whipple, JEFFERSON - KAYLA    Reason for Appointment Request: Established Patient - Available appointments did not meet patient need  --------------------------------------------------------------------------------------------------------------------------    Relationship to Patient: Self     Call Back Information: OK to leave message on voicemail  Preferred Call Back Number: Phone [phone] (home)        Patient want to schedule a Pre op visit with her provider because she will be having a surgery on 12/13/2024. If the provider of this patient is not available, she want to see a nurse practitioner or other provider that has an available appointment.

## 2024-12-11 ENCOUNTER — OFFICE VISIT (OUTPATIENT)
Facility: CLINIC | Age: 49
End: 2024-12-11
Payer: COMMERCIAL

## 2024-12-11 VITALS
SYSTOLIC BLOOD PRESSURE: 129 MMHG | WEIGHT: 159.4 LBS | DIASTOLIC BLOOD PRESSURE: 84 MMHG | RESPIRATION RATE: 18 BRPM | OXYGEN SATURATION: 97 % | HEIGHT: 62 IN | HEART RATE: 77 BPM | BODY MASS INDEX: 29.33 KG/M2 | TEMPERATURE: 97.1 F

## 2024-12-11 DIAGNOSIS — Z01.818 PREOP EXAMINATION: Primary | ICD-10-CM

## 2024-12-11 PROCEDURE — 99214 OFFICE O/P EST MOD 30 MIN: CPT | Performed by: STUDENT IN AN ORGANIZED HEALTH CARE EDUCATION/TRAINING PROGRAM

## 2024-12-11 NOTE — PROGRESS NOTES
Chery Hernandez presents today for   Chief Complaint   Patient presents with    Pre-op Exam       Is someone accompanying this pt? no    Is the patient using any DME equipment during OV? no    Health Maintenance Due   Topic Date Due    Hepatitis B vaccine (1 of 3 - 19+ 3-dose series) Never done    DTaP/Tdap/Td vaccine (1 - Tdap) Never done    Flu vaccine (1) 08/01/2024    COVID-19 Vaccine (1 - 2023-24 season) Never done         \"Have you been to the ER, urgent care clinic since your last visit?  Hospitalized since your last visit?\"    NO    “Have you seen or consulted any other health care providers outside our system since your last visit?”    NO           
09:24 AM    MCV 92.6 07/03/2024 09:24 AM     Lab Results   Component Value Date/Time     07/03/2024 09:24 AM    K 3.9 07/03/2024 09:24 AM     07/03/2024 09:24 AM    CO2 30 07/03/2024 09:24 AM    BUN 15 07/03/2024 09:24 AM    GFRAA 126 06/25/2021 12:00 AM    GLOB 3.5 07/03/2024 09:24 AM    ALT 25 07/03/2024 09:24 AM    AST 27 07/03/2024 09:24 AM     Lab Results   Component Value Date/Time    CHOL 219 07/03/2024 09:24 AM    HDL 69 07/03/2024 09:24 AM     07/03/2024 09:24 AM    .8 07/06/2023 07:50 AM    LDLCEXT 128 06/26/2020 12:00 AM    VLDL 16 07/03/2024 09:24 AM    VLDL 13 05/31/2022 12:00 AM       Medical decision making complexity: moderate    I have discussed the diagnosis with the patient and the intended plan as seen in the above orders.  The patient has received an after-visit summary and questions were answered concerning future plans.  I have discussed medication side effects and warnings with the patient as well. I have reviewed the plan of care with the patient, accepted their input and they are in agreement with the treatment goals. Previous lab and imaging results were reviewed by me.     Page Duron MD   Family Medicine

## 2025-02-11 RX ORDER — LEVOTHYROXINE SODIUM 88 UG/1
88 TABLET ORAL DAILY
Qty: 90 TABLET | Refills: 1 | Status: SHIPPED | OUTPATIENT
Start: 2025-02-11

## 2025-02-26 ENCOUNTER — HOSPITAL ENCOUNTER (OUTPATIENT)
Age: 50
Setting detail: SPECIMEN
Discharge: HOME | End: 2025-03-01
Payer: COMMERCIAL

## 2025-02-26 ENCOUNTER — TELEMEDICINE (OUTPATIENT)
Facility: CLINIC | Age: 50
End: 2025-02-26

## 2025-02-26 DIAGNOSIS — R35.0 URINARY FREQUENCY: Primary | ICD-10-CM

## 2025-02-26 DIAGNOSIS — R35.0 URINARY FREQUENCY: ICD-10-CM

## 2025-02-26 DIAGNOSIS — R10.2 SUPRAPUBIC DISCOMFORT: ICD-10-CM

## 2025-02-26 LAB
APPEARANCE UR: CLEAR
BACTERIA URNS QL MICRO: ABNORMAL /HPF
BILIRUB UR QL: NEGATIVE
BILIRUBIN, URINE, POC: NEGATIVE
BLOOD URINE, POC: NORMAL
COLOR UR: YELLOW
EPITH CASTS URNS QL MICRO: ABNORMAL /LPF (ref 0–20)
GLUCOSE UR STRIP.AUTO-MCNC: NEGATIVE MG/DL
GLUCOSE URINE, POC: NEGATIVE
HGB UR QL STRIP: ABNORMAL
KETONES UR QL STRIP.AUTO: NEGATIVE MG/DL
KETONES, URINE, POC: NEGATIVE
LEUKOCYTE ESTERASE UR QL STRIP.AUTO: ABNORMAL
LEUKOCYTE ESTERASE, URINE, POC: NORMAL
NITRITE UR QL STRIP.AUTO: NEGATIVE
NITRITE, URINE, POC: NEGATIVE
PH UR STRIP: 7 (ref 5–8)
PH, URINE, POC: 7 (ref 4.6–8)
PROT UR STRIP-MCNC: NEGATIVE MG/DL
PROTEIN,URINE, POC: NEGATIVE
RBC #/AREA URNS HPF: ABNORMAL /HPF (ref 0–2)
SP GR UR REFRACTOMETRY: 1.02 (ref 1–1.03)
SPECIFIC GRAVITY, URINE, POC: 1.02 (ref 1–1.03)
URINALYSIS CLARITY, POC: CLEAR
URINALYSIS COLOR, POC: YELLOW
UROBILINOGEN UR QL STRIP.AUTO: 0.2 EU/DL (ref 0.2–1)
UROBILINOGEN, POC: NORMAL MG/DL
WBC URNS QL MICRO: ABNORMAL /HPF (ref 0–4)

## 2025-02-26 PROCEDURE — 81001 URINALYSIS AUTO W/SCOPE: CPT

## 2025-02-26 PROCEDURE — 87086 URINE CULTURE/COLONY COUNT: CPT

## 2025-02-26 RX ORDER — CIPROFLOXACIN 500 MG/1
500 TABLET, FILM COATED ORAL 2 TIMES DAILY
Qty: 6 TABLET | Refills: 0 | Status: SHIPPED | OUTPATIENT
Start: 2025-02-26 | End: 2025-03-01

## 2025-02-26 ASSESSMENT — ENCOUNTER SYMPTOMS
NAUSEA: 0
VOMITING: 0
ABDOMINAL PAIN: 1

## 2025-02-26 NOTE — PROGRESS NOTES
2025    TELEHEALTH EVALUATION -- Audio/Visual    HPI:    Chery Hernandez (:  1975) has requested an audio/video evaluation for the following concern(s):    Ms. Hernandez presents with concern for a possible UTI. C/o mild suprapubic discomfort as well as frequency. She admits she sometimes feels mild similar symptoms without UTI's. ? Hx interstitial cystitis. She is leaving town this  to go out of country to [place].     States she has seen urology in the past for possible interstitial cystitis as well as microhematuria. Chart review shows a negative work-up. Admits she had to cancel her appt and did not have a final follow-up.     Review of Systems   Constitutional:  Negative for chills and fever.   Gastrointestinal:  Positive for abdominal pain (suprapubic). Negative for nausea and vomiting.   Genitourinary:  Positive for frequency. Negative for dysuria, flank pain and urgency.       Prior to Visit Medications    Medication Sig Taking? Authorizing Provider   levothyroxine (SYNTHROID) 88 MCG tablet Take 1 tablet by mouth Daily  Watson Hardin, APRN - NP   citalopram (CELEXA) 10 MG tablet Take 1 tablet by mouth daily  Watson Hardin APRN - NP   ergocalciferol (ERGOCALCIFEROL) 1.25 MG (51869 UT) capsule Take 1 capsule by mouth every 7 days  Watson Hardin APRN - NP   Multiple Vitamin (MULTIVITAMIN PO) Take by mouth  Provider, MD Steffi       Social History     Tobacco Use    Smoking status: Never    Smokeless tobacco: Never   Substance Use Topics    Alcohol use: Never    Drug use: Never        Allergies   Allergen Reactions    Sulfa Antibiotics Hives, Itching, Other (See Comments) and Rash     Rash & Hives     ,   Past Medical History:   Diagnosis Date    Acquired hypothyroidism 2021    Allergies     Anxiety disorder     History of chicken pox     Hypothyroidism     Obstruction of left tear duct     Patient reports blockage of tear duct surgery approximately 6 years ago

## 2025-02-26 NOTE — PROGRESS NOTES
1. \"Have you been to the ER, urgent care clinic since your last visit?  Hospitalized since your last visit?\" No    2. \"Have you seen or consulted any other health care providers outside of the [facility] since your last visit?\"  Recent tear duct surgery 12/2024 - doing well.      3. For patients aged 45-75: Has the patient had a colonoscopy / FIT/ Cologuard? Yes - no Care Gap present      If the patient is female:    4. For patients aged 40-74: Has the patient had a mammogram within the past 2 years? Yes - no Care Gap present      5. For patients aged 21-65: Has the patient had a pap smear? Yes - no Care Gap present

## 2025-02-27 LAB
BACTERIA SPEC CULT: NORMAL
Lab: NORMAL

## 2025-05-12 DIAGNOSIS — F41.9 ANXIETY: ICD-10-CM

## 2025-05-12 RX ORDER — CITALOPRAM HYDROBROMIDE 10 MG/1
10 TABLET ORAL DAILY
Qty: 90 TABLET | Refills: 1 | Status: SHIPPED | OUTPATIENT
Start: 2025-05-12

## 2025-07-02 SDOH — ECONOMIC STABILITY: FOOD INSECURITY: WITHIN THE PAST 12 MONTHS, THE FOOD YOU BOUGHT JUST DIDN'T LAST AND YOU DIDN'T HAVE MONEY TO GET MORE.: NEVER TRUE

## 2025-07-02 SDOH — ECONOMIC STABILITY: FOOD INSECURITY: WITHIN THE PAST 12 MONTHS, YOU WORRIED THAT YOUR FOOD WOULD RUN OUT BEFORE YOU GOT MONEY TO BUY MORE.: NEVER TRUE

## 2025-07-02 SDOH — ECONOMIC STABILITY: INCOME INSECURITY: IN THE LAST 12 MONTHS, WAS THERE A TIME WHEN YOU WERE NOT ABLE TO PAY THE MORTGAGE OR RENT ON TIME?: NO

## 2025-07-02 SDOH — ECONOMIC STABILITY: TRANSPORTATION INSECURITY
IN THE PAST 12 MONTHS, HAS LACK OF TRANSPORTATION KEPT YOU FROM MEETINGS, WORK, OR FROM GETTING THINGS NEEDED FOR DAILY LIVING?: NO

## 2025-07-03 ENCOUNTER — HOSPITAL ENCOUNTER (OUTPATIENT)
Age: 50
Setting detail: SPECIMEN
Discharge: HOME | End: 2025-07-06

## 2025-07-03 ENCOUNTER — OFFICE VISIT (OUTPATIENT)
Facility: CLINIC | Age: 50
End: 2025-07-03
Payer: COMMERCIAL

## 2025-07-03 VITALS
HEART RATE: 88 BPM | RESPIRATION RATE: 17 BRPM | TEMPERATURE: 98 F | BODY MASS INDEX: 28.74 KG/M2 | DIASTOLIC BLOOD PRESSURE: 77 MMHG | WEIGHT: 156.2 LBS | SYSTOLIC BLOOD PRESSURE: 116 MMHG | OXYGEN SATURATION: 96 % | HEIGHT: 62 IN

## 2025-07-03 DIAGNOSIS — Z13.1 DIABETES MELLITUS SCREENING: ICD-10-CM

## 2025-07-03 DIAGNOSIS — E03.9 ACQUIRED HYPOTHYROIDISM: Primary | ICD-10-CM

## 2025-07-03 DIAGNOSIS — F41.9 ANXIETY: ICD-10-CM

## 2025-07-03 DIAGNOSIS — E55.9 VITAMIN D DEFICIENCY, UNSPECIFIED: ICD-10-CM

## 2025-07-03 DIAGNOSIS — E78.2 MIXED HYPERLIPIDEMIA: ICD-10-CM

## 2025-07-03 DIAGNOSIS — R68.89 OTHER GENERAL SYMPTOMS AND SIGNS: ICD-10-CM

## 2025-07-03 LAB — LABCORP DRAW FEE: NORMAL

## 2025-07-03 PROCEDURE — 99001 SPECIMEN HANDLING PT-LAB: CPT

## 2025-07-03 PROCEDURE — 99214 OFFICE O/P EST MOD 30 MIN: CPT | Performed by: NURSE PRACTITIONER

## 2025-07-03 RX ORDER — ERGOCALCIFEROL 1.25 MG/1
50000 CAPSULE ORAL
Qty: 13 CAPSULE | Refills: 3 | Status: SHIPPED | OUTPATIENT
Start: 2025-07-03

## 2025-07-03 ASSESSMENT — PATIENT HEALTH QUESTIONNAIRE - PHQ9
SUM OF ALL RESPONSES TO PHQ QUESTIONS 1-9: 0
1. LITTLE INTEREST OR PLEASURE IN DOING THINGS: NOT AT ALL
SUM OF ALL RESPONSES TO PHQ QUESTIONS 1-9: 0
SUM OF ALL RESPONSES TO PHQ QUESTIONS 1-9: 0
2. FEELING DOWN, DEPRESSED OR HOPELESS: NOT AT ALL
SUM OF ALL RESPONSES TO PHQ QUESTIONS 1-9: 0

## 2025-07-03 NOTE — PROGRESS NOTES
Chery Hernandez presents today for   Chief Complaint   Patient presents with    Annual Exam       Is someone accompanying this pt? no    Is the patient using any DME equipment during OV? no    Health Maintenance Due   Topic Date Due    Hepatitis B vaccine (1 of 3 - 19+ 3-dose series) Never done    DTaP/Tdap/Td vaccine (1 - Tdap) Never done    COVID-19 Vaccine (1 - 2024-25 season) Never done    Shingles vaccine (1 of 2) Never done    Pneumococcal 50+ years Vaccine (1 of 1 - PCV) Never done    Depression Screen  07/03/2025         \"Have you been to the ER, urgent care clinic since your last visit?  Hospitalized since your last visit?\"    NO    “Have you seen or consulted any other health care providers outside our system since your last visit?”    NO

## 2025-07-03 NOTE — PROGRESS NOTES
History of Present Illness  Chery Hernandez is a 50 y.o. female who presents today for:    Chief Complaint   Patient presents with    Annual Exam       Past Medical History  Past Medical History:   Diagnosis Date    Acquired hypothyroidism 6/23/2021    Allergies     Anxiety disorder     History of chicken pox     Hypothyroidism     Obstruction of left tear duct     Patient reports blockage of tear duct surgery approximately 6 years ago        Surgical History  Past Surgical History:   Procedure Laterality Date    CYSTOSCOPY,RESEC EJACULATORY DUCT Right 06/01/2022    right tear duct blocked     EYE SURGERY  June 2022    tear duct    OTHER SURGICAL HISTORY      tear duct surgery        Current Medications  Current Outpatient Medications   Medication Sig    citalopram (CELEXA) 10 MG tablet TAKE 1 TABLET BY MOUTH DAILY    MAGNESIUM GLUCONATE PO Take by mouth    Ascorbic Acid (VITAMIN C PO) Take by mouth    levothyroxine (SYNTHROID) 88 MCG tablet Take 1 tablet by mouth Daily    ergocalciferol (ERGOCALCIFEROL) 1.25 MG (72489 UT) capsule Take 1 capsule by mouth every 7 days    Multiple Vitamin (MULTIVITAMIN PO) Take by mouth     No current facility-administered medications for this visit.       Allergies/Drug Reactions  Allergies   Allergen Reactions    Sulfa Antibiotics Hives, Itching, Other (See Comments) and Rash     Rash & Hives          Family History  Family History   Problem Relation Age of Onset    High Blood Pressure Sister     High Blood Pressure Brother     No Known Problems Father     High Blood Pressure Mother         Social History  Social History     Tobacco Use    Smoking status: Never    Smokeless tobacco: Never   Substance Use Topics    Alcohol use: Never    Drug use: Never        Health Maintenance   Topic Date Due    Hepatitis B vaccine (1 of 3 - 19+ 3-dose series) Never done    DTaP/Tdap/Td vaccine (1 - Tdap) Never done    COVID-19 Vaccine (1 - 2024-25 season) Never done    Shingles vaccine (1 of 2)

## 2025-07-07 LAB
ALBUMIN SERPL-MCNC: 4.4 G/DL (ref 3.9–4.9)
ALP SERPL-CCNC: 110 IU/L (ref 44–121)
ALT SERPL-CCNC: 12 IU/L (ref 0–32)
AST SERPL-CCNC: 19 IU/L (ref 0–40)
BASOPHILS # BLD AUTO: 0.1 X10E3/UL (ref 0–0.2)
BASOPHILS NFR BLD AUTO: 1 %
BILIRUB SERPL-MCNC: 0.5 MG/DL (ref 0–1.2)
BUN SERPL-MCNC: 15 MG/DL (ref 6–24)
BUN/CREAT SERPL: 21 (ref 9–23)
CALCIUM SERPL-MCNC: 9.6 MG/DL (ref 8.7–10.2)
CHLORIDE SERPL-SCNC: 104 MMOL/L (ref 96–106)
CHOLEST SERPL-MCNC: 212 MG/DL (ref 100–199)
CO2 SERPL-SCNC: 24 MMOL/L (ref 20–29)
CREAT SERPL-MCNC: 0.72 MG/DL (ref 0.57–1)
EGFRCR SERPLBLD CKD-EPI 2021: 102 ML/MIN/1.73
EOSINOPHIL # BLD AUTO: 0.2 X10E3/UL (ref 0–0.4)
EOSINOPHIL NFR BLD AUTO: 3 %
ERYTHROCYTE [DISTWIDTH] IN BLOOD BY AUTOMATED COUNT: 12.2 % (ref 11.7–15.4)
GLOBULIN SER CALC-MCNC: 2.5 G/DL (ref 1.5–4.5)
GLUCOSE SERPL-MCNC: 86 MG/DL (ref 70–99)
HBA1C MFR BLD: 5 % (ref 4.8–5.6)
HCT VFR BLD AUTO: 40.6 % (ref 34–46.6)
HDLC SERPL-MCNC: 63 MG/DL
HGB BLD-MCNC: 13.4 G/DL (ref 11.1–15.9)
IMM GRANULOCYTES # BLD AUTO: 0 X10E3/UL (ref 0–0.1)
IMM GRANULOCYTES NFR BLD AUTO: 0 %
LDLC SERPL CALC-MCNC: 134 MG/DL (ref 0–99)
LYMPHOCYTES # BLD AUTO: 2.6 X10E3/UL (ref 0.7–3.1)
LYMPHOCYTES NFR BLD AUTO: 47 %
MCH RBC QN AUTO: 31.6 PG (ref 26.6–33)
MCHC RBC AUTO-ENTMCNC: 33 G/DL (ref 31.5–35.7)
MCV RBC AUTO: 96 FL (ref 79–97)
MONOCYTES # BLD AUTO: 0.4 X10E3/UL (ref 0.1–0.9)
MONOCYTES NFR BLD AUTO: 8 %
NEUTROPHILS # BLD AUTO: 2.2 X10E3/UL (ref 1.4–7)
NEUTROPHILS NFR BLD AUTO: 41 %
PLATELET # BLD AUTO: 245 X10E3/UL (ref 150–450)
POTASSIUM SERPL-SCNC: 4.2 MMOL/L (ref 3.5–5.2)
PROT SERPL-MCNC: 6.9 G/DL (ref 6–8.5)
RBC # BLD AUTO: 4.24 X10E6/UL (ref 3.77–5.28)
SODIUM SERPL-SCNC: 141 MMOL/L (ref 134–144)
SPECIMEN STATUS REPORT: NORMAL
TRIGL SERPL-MCNC: 84 MG/DL (ref 0–149)
VLDLC SERPL CALC-MCNC: 15 MG/DL (ref 5–40)
WBC # BLD AUTO: 5.4 X10E3/UL (ref 3.4–10.8)

## 2025-07-08 LAB — SPECIMEN STATUS REPORT: NORMAL

## 2025-07-09 LAB
TSH SERPL DL<=0.005 MIU/L-ACNC: 1.51 UIU/ML (ref 0.45–4.5)
VIT B12 SERPL-MCNC: 934 PG/ML (ref 232–1245)

## 2025-08-28 RX ORDER — LEVOTHYROXINE SODIUM 88 UG/1
88 TABLET ORAL DAILY
Qty: 90 TABLET | Refills: 1 | Status: SHIPPED | OUTPATIENT
Start: 2025-08-28